# Patient Record
Sex: FEMALE | Race: WHITE | NOT HISPANIC OR LATINO | Employment: OTHER | URBAN - METROPOLITAN AREA
[De-identification: names, ages, dates, MRNs, and addresses within clinical notes are randomized per-mention and may not be internally consistent; named-entity substitution may affect disease eponyms.]

---

## 2018-08-27 ENCOUNTER — OFFICE VISIT CONVERTED (OUTPATIENT)
Dept: FAMILY MEDICINE CLINIC | Facility: CLINIC | Age: 58
End: 2018-08-27
Attending: NURSE PRACTITIONER

## 2019-03-13 ENCOUNTER — HOSPITAL ENCOUNTER (EMERGENCY)
Facility: HOSPITAL | Age: 59
Discharge: HOME | End: 2019-03-13
Attending: EMERGENCY MEDICINE | Admitting: EMERGENCY MEDICINE

## 2019-03-13 ENCOUNTER — APPOINTMENT (OUTPATIENT)
Dept: GENERAL RADIOLOGY | Facility: HOSPITAL | Age: 59
End: 2019-03-13

## 2019-03-13 VITALS
RESPIRATION RATE: 18 BRPM | HEART RATE: 88 BPM | OXYGEN SATURATION: 89 % | BODY MASS INDEX: 38.51 KG/M2 | SYSTOLIC BLOOD PRESSURE: 121 MMHG | TEMPERATURE: 98.7 F | HEIGHT: 69 IN | WEIGHT: 260 LBS | DIASTOLIC BLOOD PRESSURE: 83 MMHG

## 2019-03-13 DIAGNOSIS — R09.1 PLEURISY: Primary | ICD-10-CM

## 2019-03-13 DIAGNOSIS — J06.9 VIRAL UPPER RESPIRATORY TRACT INFECTION: ICD-10-CM

## 2019-03-13 DIAGNOSIS — R05.9 COUGH: ICD-10-CM

## 2019-03-13 LAB
FLUAV AG NPH QL: NEGATIVE
FLUBV AG NPH QL IA: NEGATIVE

## 2019-03-13 PROCEDURE — 87804 INFLUENZA ASSAY W/OPTIC: CPT | Performed by: PHYSICIAN ASSISTANT

## 2019-03-13 PROCEDURE — 99283 EMERGENCY DEPT VISIT LOW MDM: CPT

## 2019-03-13 PROCEDURE — 71046 X-RAY EXAM CHEST 2 VIEWS: CPT

## 2019-03-13 RX ORDER — METHYLPREDNISOLONE 4 MG/1
TABLET ORAL
Qty: 21 TABLET | Refills: 0 | Status: SHIPPED | OUTPATIENT
Start: 2019-03-13 | End: 2019-06-18

## 2019-03-13 RX ORDER — BENZONATATE 100 MG/1
100 CAPSULE ORAL 3 TIMES DAILY PRN
Qty: 21 CAPSULE | Refills: 0 | Status: SHIPPED | OUTPATIENT
Start: 2019-03-13 | End: 2019-06-18

## 2019-03-13 RX ORDER — ALBUTEROL SULFATE 90 UG/1
2 AEROSOL, METERED RESPIRATORY (INHALATION) EVERY 4 HOURS PRN
Qty: 1 INHALER | Refills: 0 | Status: SHIPPED | OUTPATIENT
Start: 2019-03-13 | End: 2019-06-18

## 2019-03-13 NOTE — DISCHARGE INSTRUCTIONS
Return to the emergency department with worsening symptoms, uncontrolled pain, inability to tolerate oral liquids, fever greater than 101° F not controlled by Tylenol or as needed with emergent concerns.    You may also take over-the-counter decongestants as needed for nasal congestion.

## 2019-03-13 NOTE — ED PROVIDER NOTES
EMERGENCY DEPARTMENT ENCOUNTER      Room Number: 3/03    History is provided by the patient, no translation services needed    HPI:    Chief complaint: Cough, URI symptoms    Location: Pain in ribcage with cough    Quality/Severity: 8/10 when coughing, minimal otherwise    Timing/Duration: 6 days    Modifying Factors: Patient has been taking ibuprofen with minimal relief    Associated Symptoms: Positive for subjective fever, myalgias, rhinorrhea, nasal congestion, nonproductive cough, and pain in rib cage when coughing.  Reports she vomited once yesterday, denies any current nausea.  Patient denies any shortness of air, abdominal pain, diarrhea, constipation.    Narrative: Pt is a 58 y.o. female who presents complaining of dry cough for the past 6 days.  Patient reports she vomited once yesterday, but has not vomited since then.  States she is staying well-hydrated, she felt like she had a fever yesterday but did not take her temperature, also reports the other above symptoms.  Patient states she is having rib pain bilaterally when she coughs.  States she has not been coughing up any sputum.      PMD: Provider, No Known    REVIEW OF SYSTEMS  Review of Systems    General: Positive for subjective fever.   Denies any weakness or fatigue.  Denies any weight loss or weight gain.  SKIN: Denies any rashes lesions or ulcers.  Denies color change.  ENT: Positive for sore throat and rhinorrhea.  Denies ear pain.    EYES: Denies any blurred vision.  Denies any change in vision.  Denies any photophobia.  Denies any vision loss.  LUNGS: Positive for dry cough, and pain in ribs when coughing.  Denies any shortness of breath or wheezing.    Denies any hemoptysis.  CARDIAC: Denies any chest pain.  Denies palpitations.  Denies syncope.  Denies any edema  ABD: Positive for one episode of vomiting yesterday.  Denies any abdominal pain.  Denies any nausea or vomiting or diarrhea.  Denies any rectal bleeding.  Denies constipation  :  Denies any dysuria, urgency, frequency or hematuria.  Denies discharge.  Denies flank pain.  NEURO: Denies any focal weakness.  Denies headache.  Denies seizures.  Denies changes in speech or difficulty walking.  ENDOCRINE: Denies polydipsia and polyuria  M/S: Denies arthralgias, back pain, myalgias or neck pain  HEME/LYMPH: Negative for adenopathy. Does not bruise/bleed easily.   PSYCH: Negative for suicidal ideas. Denies anxiety or depression      PAST MEDICAL HISTORY  Active Ambulatory Problems     Diagnosis Date Noted   • No Active Ambulatory Problems     Resolved Ambulatory Problems     Diagnosis Date Noted   • No Resolved Ambulatory Problems     Past Medical History:   Diagnosis Date   • Migraine        PAST SURGICAL HISTORY  Past Surgical History:   Procedure Laterality Date   • BACK SURGERY     • DILATATION AND CURETTAGE         FAMILY HISTORY  History reviewed. No pertinent family history.    SOCIAL HISTORY  Social History     Socioeconomic History   • Marital status: Single     Spouse name: Not on file   • Number of children: Not on file   • Years of education: Not on file   • Highest education level: Not on file   Social Needs   • Financial resource strain: Not on file   • Food insecurity - worry: Not on file   • Food insecurity - inability: Not on file   • Transportation needs - medical: Not on file   • Transportation needs - non-medical: Not on file   Occupational History   • Not on file   Tobacco Use   • Smoking status: Never Smoker   Substance and Sexual Activity   • Alcohol use: No     Frequency: Never   • Drug use: No   • Sexual activity: Not on file   Other Topics Concern   • Not on file   Social History Narrative   • Not on file       ALLERGIES  Patient has no known allergies.    No current facility-administered medications for this encounter.   No current outpatient medications on file.    PHYSICAL EXAM  ED Triage Vitals [03/13/19 1334]   Temp Heart Rate Resp BP SpO2   98.7 °F (37.1 °C) 88 18  146/87 95 %      Temp src Heart Rate Source Patient Position BP Location FiO2 (%)   Oral Monitor Sitting Right arm --       Physical Exam    Constitutional: Pt is oriented to person, place, and time, appears well-developed and well-nourished. No distress.   HENT:   Head: Normocephalic and atraumatic.   Right Ear: External ear normal.  Serous otitis  Left Ear: External ear normal.  Serous otitis  Mouth/Throat: Oropharynx is clear and moist. No oropharyngeal exudate.   Eyes: EOM are normal. Pupils are equal, round, and reactive to light.   Neck: Normal range of motion. Neck supple.   Cardiovascular: Normal rate, regular rhythm and normal heart sounds. Exam reveals no murmur, rubs, or gallops.   Pulmonary/Chest: Respiratory effort normal and clear to auscultation bilaterally.  Abdominal: Soft, non-distended. Bowel sounds are active. There is no tenderness or guarding.   Neurological: Pt is alert and oriented to person, place, and time. No cranial nerve deficit or sensory deficit. Pt exhibits normal muscle tone and strength in all extremities bilaterally. Coordination normal.   MSK: No gross deformities appreciated.   Skin: Skin is warm and dry.   Psychiatric: Pt has a normal mood and affect. Behavior is normal. Judgment and thought content normal.         LAB RESULTS  Results for orders placed or performed during the hospital encounter of 03/13/19   Influenza Antigen, Rapid - Swab, Nasopharynx   Result Value Ref Range    Influenza A Ag, EIA Negative Negative    Influenza B Ag, EIA Negative Negative         I ordered the above labs and reviewed the results    RADIOLOGY  Xr Chest 2 View    Result Date: 3/13/2019  Narrative: CHEST X-RAY, 3/13/2019     HISTORY: 58-year-old female in the ED complaining of 2-3 week history of cough. Chest wall pain with coughing.  TECHNIQUE: PA and lateral upright chest series.  FINDINGS: Shallow lung expansion with mild basilar atelectasis. The lungs appear clear. No visible airspace  consolidation or pleural effusion. Heart size and pulmonary vascularity are within normal limits. Benign calcified granulomas in the right midlung and right hilum.      Impression: No active disease.  This report was finalized on 3/13/2019 2:49 PM by Dr. Justin Barrera MD.        I ordered the above radiologic testing and reviewed the results    PROCEDURES  Procedures      PROGRESS AND CONSULTS  ED Course as of Mar 13 1508   Wed Mar 13, 2019   1507 Discussed lab and imaging results with patient, informed her flu swab is negative, and her chest x-ray did not show any pneumonia.  Discussed that we would be giving her Tessalon, albuterol, and Medrol Dosepak.  We will also give her the contact information for local primary care providers.  [KS]      ED Course User Index  [KS] Janine Hinkle PA-C           MEDICAL DECISION MAKING  Results were reviewed/discussed with the patient and they were also made aware of online access. Pt also made aware that some labs, such as cultures, will not be resulted during ER visit and follow up with PMD is necessary.     MDM  Number of Diagnoses or Management Options  Cough:   Pleurisy:   Viral upper respiratory tract infection:      Amount and/or Complexity of Data Reviewed  Clinical lab tests: reviewed and ordered  Tests in the radiology section of CPT®: reviewed and ordered  Independent visualization of images, tracings, or specimens: yes    Risk of Complications, Morbidity, and/or Mortality  Presenting problems: low  Diagnostic procedures: low  Management options: low    Patient Progress  Patient progress: stable         My differential diagnosis for cough includes but is not limited to:  Upper respiratory infection, bronchitis, pneumonia, COPD exacerbation, cough variant asthma, bacterial, viral or lung infections, lung cancer, aspiration pneumonitis, aspiration of foreign body    DIAGNOSIS  Final diagnoses:   Pleurisy   Cough   Viral upper respiratory tract infection        Latest Documented Vital Signs:  As of 2:15 PM  BP- 120/90 HR- 88 Temp- 98.7 °F (37.1 °C) (Oral) O2 sat- 91%    DISPOSITION  Patient discharged with instructions to follow-up with primary care provider.  Prescriptions given for Tessalon, albuterol, Medrol Dosepak.    Discussed pertinent labs and imaging findings with the patient/family.  Patient/Family voiced understanding of need to follow-up for recheck, further testing as needed.  Return to the emergency Department warnings were given.         Medication List      New Prescriptions    albuterol sulfate  (90 Base) MCG/ACT inhaler  Commonly known as:  PROVENTIL HFA;VENTOLIN HFA;PROAIR HFA  Inhale 2 puffs Every 4 (Four) Hours As Needed for Shortness of Air.     benzonatate 100 MG capsule  Commonly known as:  TESSALON  Take 1 capsule by mouth 3 (Three) Times a Day As Needed for Cough.     methylPREDNISolone 4 MG tablet  Commonly known as:  MEDROL (SAMANTHA)  Take as directed on package instructions.                Dictated utilizing Dragon dictation       Janine Hinkle PA-C  03/13/19 8298

## 2019-06-18 ENCOUNTER — APPOINTMENT (OUTPATIENT)
Dept: CT IMAGING | Facility: HOSPITAL | Age: 59
End: 2019-06-18

## 2019-06-18 ENCOUNTER — HOSPITAL ENCOUNTER (EMERGENCY)
Facility: HOSPITAL | Age: 59
Discharge: HOME | End: 2019-06-18
Attending: EMERGENCY MEDICINE | Admitting: EMERGENCY MEDICINE

## 2019-06-18 VITALS
TEMPERATURE: 97.7 F | HEART RATE: 82 BPM | SYSTOLIC BLOOD PRESSURE: 98 MMHG | OXYGEN SATURATION: 90 % | RESPIRATION RATE: 20 BRPM | WEIGHT: 241.5 LBS | DIASTOLIC BLOOD PRESSURE: 66 MMHG | HEIGHT: 69 IN | BODY MASS INDEX: 35.77 KG/M2

## 2019-06-18 DIAGNOSIS — N23 RENAL COLIC ON LEFT SIDE: ICD-10-CM

## 2019-06-18 DIAGNOSIS — N20.1 URETEROLITHIASIS: Primary | ICD-10-CM

## 2019-06-18 LAB
ALBUMIN SERPL-MCNC: 3.9 G/DL (ref 3.5–5.2)
ALBUMIN/GLOB SERPL: 1.4 G/DL
ALP SERPL-CCNC: 57 U/L (ref 39–117)
ALT SERPL W P-5'-P-CCNC: 14 U/L (ref 1–33)
AMORPH URATE CRY URNS QL MICRO: ABNORMAL /HPF
ANION GAP SERPL CALCULATED.3IONS-SCNC: 14.4 MMOL/L
AST SERPL-CCNC: 21 U/L (ref 1–32)
BACTERIA UR QL AUTO: ABNORMAL /HPF
BASOPHILS # BLD AUTO: 0.03 10*3/MM3 (ref 0–0.2)
BASOPHILS NFR BLD AUTO: 0.5 % (ref 0–1.5)
BILIRUB SERPL-MCNC: 0.4 MG/DL (ref 0.2–1.2)
BILIRUB UR QL STRIP: ABNORMAL
BUN BLD-MCNC: 17 MG/DL (ref 6–20)
BUN/CREAT SERPL: 21 (ref 7–25)
CALCIUM SPEC-SCNC: 9.1 MG/DL (ref 8.6–10.5)
CHLORIDE SERPL-SCNC: 107 MMOL/L (ref 98–107)
CLARITY UR: ABNORMAL
CO2 SERPL-SCNC: 21.6 MMOL/L (ref 22–29)
COLOR UR: YELLOW
CREAT BLD-MCNC: 0.81 MG/DL (ref 0.57–1)
DEPRECATED RDW RBC AUTO: 45.5 FL (ref 37–54)
EOSINOPHIL # BLD AUTO: 0.06 10*3/MM3 (ref 0–0.4)
EOSINOPHIL NFR BLD AUTO: 0.9 % (ref 0.3–6.2)
ERYTHROCYTE [DISTWIDTH] IN BLOOD BY AUTOMATED COUNT: 13.2 % (ref 12.3–15.4)
GFR SERPL CREATININE-BSD FRML MDRD: 73 ML/MIN/1.73
GLOBULIN UR ELPH-MCNC: 2.8 GM/DL
GLUCOSE BLD-MCNC: 154 MG/DL (ref 65–99)
GLUCOSE UR STRIP-MCNC: NEGATIVE MG/DL
HCG SERPL QL: NEGATIVE
HCT VFR BLD AUTO: 46.3 % (ref 34–46.6)
HGB BLD-MCNC: 15.5 G/DL (ref 12–15.9)
HGB UR QL STRIP.AUTO: ABNORMAL
HYALINE CASTS UR QL AUTO: ABNORMAL /LPF
IMM GRANULOCYTES # BLD AUTO: 0.01 10*3/MM3 (ref 0–0.05)
IMM GRANULOCYTES NFR BLD AUTO: 0.2 % (ref 0–0.5)
KETONES UR QL STRIP: ABNORMAL
LEUKOCYTE ESTERASE UR QL STRIP.AUTO: NEGATIVE
LYMPHOCYTES # BLD AUTO: 1.61 10*3/MM3 (ref 0.7–3.1)
LYMPHOCYTES NFR BLD AUTO: 24.6 % (ref 19.6–45.3)
MCH RBC QN AUTO: 31.9 PG (ref 26.6–33)
MCHC RBC AUTO-ENTMCNC: 33.5 G/DL (ref 31.5–35.7)
MCV RBC AUTO: 95.3 FL (ref 79–97)
MONOCYTES # BLD AUTO: 0.39 10*3/MM3 (ref 0.1–0.9)
MONOCYTES NFR BLD AUTO: 6 % (ref 5–12)
NEUTROPHILS # BLD AUTO: 4.45 10*3/MM3 (ref 1.7–7)
NEUTROPHILS NFR BLD AUTO: 67.8 % (ref 42.7–76)
NITRITE UR QL STRIP: NEGATIVE
NRBC BLD AUTO-RTO: 0 /100 WBC (ref 0–0.2)
PH UR STRIP.AUTO: <=5 [PH] (ref 4.5–8)
PLATELET # BLD AUTO: 214 10*3/MM3 (ref 140–450)
PMV BLD AUTO: 10.7 FL (ref 6–12)
POTASSIUM BLD-SCNC: 3.8 MMOL/L (ref 3.5–5.2)
PROT SERPL-MCNC: 6.7 G/DL (ref 6–8.5)
PROT UR QL STRIP: ABNORMAL
RBC # BLD AUTO: 4.86 10*6/MM3 (ref 3.77–5.28)
RBC # UR: ABNORMAL /HPF
REF LAB TEST METHOD: ABNORMAL
SODIUM BLD-SCNC: 143 MMOL/L (ref 136–145)
SP GR UR STRIP: 1.03 (ref 1–1.03)
SQUAMOUS #/AREA URNS HPF: ABNORMAL /HPF
UROBILINOGEN UR QL STRIP: ABNORMAL
WBC NRBC COR # BLD: 6.55 10*3/MM3 (ref 3.4–10.8)
WBC UR QL AUTO: ABNORMAL /HPF

## 2019-06-18 PROCEDURE — 96376 TX/PRO/DX INJ SAME DRUG ADON: CPT

## 2019-06-18 PROCEDURE — 81001 URINALYSIS AUTO W/SCOPE: CPT | Performed by: EMERGENCY MEDICINE

## 2019-06-18 PROCEDURE — 25010000002 ONDANSETRON PER 1 MG: Performed by: EMERGENCY MEDICINE

## 2019-06-18 PROCEDURE — 99284 EMERGENCY DEPT VISIT MOD MDM: CPT | Performed by: EMERGENCY MEDICINE

## 2019-06-18 PROCEDURE — 99284 EMERGENCY DEPT VISIT MOD MDM: CPT

## 2019-06-18 PROCEDURE — 74176 CT ABD & PELVIS W/O CONTRAST: CPT

## 2019-06-18 PROCEDURE — 80053 COMPREHEN METABOLIC PANEL: CPT | Performed by: EMERGENCY MEDICINE

## 2019-06-18 PROCEDURE — 96375 TX/PRO/DX INJ NEW DRUG ADDON: CPT

## 2019-06-18 PROCEDURE — 25010000002 HYDROMORPHONE PER 4 MG: Performed by: EMERGENCY MEDICINE

## 2019-06-18 PROCEDURE — 25010000002 PROMETHAZINE PER 50 MG: Performed by: EMERGENCY MEDICINE

## 2019-06-18 PROCEDURE — 96374 THER/PROPH/DIAG INJ IV PUSH: CPT

## 2019-06-18 PROCEDURE — 84703 CHORIONIC GONADOTROPIN ASSAY: CPT | Performed by: EMERGENCY MEDICINE

## 2019-06-18 PROCEDURE — 85025 COMPLETE CBC W/AUTO DIFF WBC: CPT | Performed by: EMERGENCY MEDICINE

## 2019-06-18 RX ORDER — HYDROMORPHONE HCL 110MG/55ML
0.5 PATIENT CONTROLLED ANALGESIA SYRINGE INTRAVENOUS ONCE
Status: COMPLETED | OUTPATIENT
Start: 2019-06-18 | End: 2019-06-18

## 2019-06-18 RX ORDER — ONDANSETRON 2 MG/ML
4 INJECTION INTRAMUSCULAR; INTRAVENOUS ONCE
Status: COMPLETED | OUTPATIENT
Start: 2019-06-18 | End: 2019-06-18

## 2019-06-18 RX ORDER — SODIUM CHLORIDE 0.9 % (FLUSH) 0.9 %
10 SYRINGE (ML) INJECTION AS NEEDED
Status: DISCONTINUED | OUTPATIENT
Start: 2019-06-18 | End: 2019-06-18 | Stop reason: HOSPADM

## 2019-06-18 RX ORDER — SODIUM CHLORIDE 9 MG/ML
INJECTION, SOLUTION INTRAVENOUS
Status: DISCONTINUED
Start: 2019-06-18 | End: 2019-06-18 | Stop reason: HOSPADM

## 2019-06-18 RX ORDER — PROMETHAZINE HYDROCHLORIDE 25 MG/1
TABLET ORAL
Qty: 30 TABLET | Refills: 0 | Status: SHIPPED | OUTPATIENT
Start: 2019-06-18 | End: 2019-12-11

## 2019-06-18 RX ORDER — TAMSULOSIN HYDROCHLORIDE 0.4 MG/1
CAPSULE ORAL
Qty: 7 CAPSULE | Refills: 0 | Status: SHIPPED | OUTPATIENT
Start: 2019-06-18 | End: 2019-12-11

## 2019-06-18 RX ORDER — PROMETHAZINE HYDROCHLORIDE 25 MG/ML
12.5 INJECTION, SOLUTION INTRAMUSCULAR; INTRAVENOUS ONCE
Status: COMPLETED | OUTPATIENT
Start: 2019-06-18 | End: 2019-06-18

## 2019-06-18 RX ORDER — HYDROCODONE BITARTRATE AND ACETAMINOPHEN 5; 325 MG/1; MG/1
TABLET ORAL
Qty: 15 TABLET | Refills: 0 | Status: SHIPPED | OUTPATIENT
Start: 2019-06-18 | End: 2019-12-11

## 2019-06-18 RX ADMIN — PROMETHAZINE HYDROCHLORIDE 12.5 MG: 25 INJECTION INTRAMUSCULAR; INTRAVENOUS at 10:27

## 2019-06-18 RX ADMIN — HYDROMORPHONE HYDROCHLORIDE 0.5 MG: 2 INJECTION, SOLUTION INTRAMUSCULAR; INTRAVENOUS; SUBCUTANEOUS at 09:25

## 2019-06-18 RX ADMIN — ONDANSETRON 4 MG: 2 INJECTION, SOLUTION INTRAMUSCULAR; INTRAVENOUS at 09:32

## 2019-06-18 RX ADMIN — SODIUM CHLORIDE 1000 ML: 9 INJECTION, SOLUTION INTRAVENOUS at 09:34

## 2019-06-18 RX ADMIN — HYDROMORPHONE HYDROCHLORIDE 0.5 MG: 2 INJECTION, SOLUTION INTRAMUSCULAR; INTRAVENOUS; SUBCUTANEOUS at 10:27

## 2019-12-11 ENCOUNTER — HOSPITAL ENCOUNTER (OUTPATIENT)
Dept: GENERAL RADIOLOGY | Facility: HOSPITAL | Age: 59
Discharge: HOME | End: 2019-12-11
Admitting: FAMILY MEDICINE

## 2019-12-11 ENCOUNTER — OFFICE VISIT (OUTPATIENT)
Dept: FAMILY MEDICINE CLINIC | Facility: CLINIC | Age: 59
End: 2019-12-11

## 2019-12-11 VITALS
WEIGHT: 251 LBS | HEIGHT: 69 IN | TEMPERATURE: 97.4 F | HEART RATE: 80 BPM | DIASTOLIC BLOOD PRESSURE: 70 MMHG | OXYGEN SATURATION: 98 % | SYSTOLIC BLOOD PRESSURE: 112 MMHG | RESPIRATION RATE: 14 BRPM | BODY MASS INDEX: 37.18 KG/M2

## 2019-12-11 DIAGNOSIS — E66.09 OTHER OBESITY DUE TO EXCESS CALORIES: ICD-10-CM

## 2019-12-11 DIAGNOSIS — E66.01 CLASS 2 SEVERE OBESITY DUE TO EXCESS CALORIES WITH SERIOUS COMORBIDITY AND BODY MASS INDEX (BMI) OF 37.0 TO 37.9 IN ADULT (HCC): ICD-10-CM

## 2019-12-11 DIAGNOSIS — Z12.11 ENCOUNTER FOR SCREENING COLONOSCOPY: ICD-10-CM

## 2019-12-11 DIAGNOSIS — Z12.4 PAP SMEAR FOR CERVICAL CANCER SCREENING: ICD-10-CM

## 2019-12-11 DIAGNOSIS — R00.2 PALPITATIONS: ICD-10-CM

## 2019-12-11 DIAGNOSIS — Z00.00 ENCOUNTER FOR WELL ADULT EXAM WITHOUT ABNORMAL FINDINGS: Primary | ICD-10-CM

## 2019-12-11 DIAGNOSIS — R60.0 LOWER EXTREMITY EDEMA: ICD-10-CM

## 2019-12-11 PROBLEM — M51.36 DEGENERATION OF LUMBAR INTERVERTEBRAL DISC: Status: ACTIVE | Noted: 2018-08-15

## 2019-12-11 PROBLEM — M47.816 LUMBAR SPONDYLOSIS: Status: ACTIVE | Noted: 2018-08-15

## 2019-12-11 PROBLEM — Z59.00 HOMELESS SINGLE PERSON: Status: ACTIVE | Noted: 2019-12-11

## 2019-12-11 PROCEDURE — 71046 X-RAY EXAM CHEST 2 VIEWS: CPT

## 2019-12-11 PROCEDURE — 99205 OFFICE O/P NEW HI 60 MIN: CPT | Performed by: FAMILY MEDICINE

## 2019-12-11 RX ORDER — POTASSIUM CHLORIDE 750 MG/1
10 TABLET, FILM COATED, EXTENDED RELEASE ORAL DAILY
Qty: 30 TABLET | Refills: 3 | Status: SHIPPED | OUTPATIENT
Start: 2019-12-11 | End: 2019-12-16

## 2019-12-11 RX ORDER — FUROSEMIDE 20 MG/1
20 TABLET ORAL DAILY
Qty: 30 TABLET | Refills: 3 | Status: SHIPPED | OUTPATIENT
Start: 2019-12-11 | End: 2019-12-16

## 2019-12-11 NOTE — ASSESSMENT & PLAN NOTE
She was given numbers for [facility] [phone]; [facility] [phone]; [facility] [phone].

## 2019-12-11 NOTE — ASSESSMENT & PLAN NOTE
Liver and kidney function were normal in June.  Check 2D echo.  Refer to cardiology.  Chest x-ray.

## 2019-12-11 NOTE — PROGRESS NOTES
Subjective   Jaye Rivas is a 59 y.o. female is here for   Chief Complaint   Patient presents with   • Leg Swelling     bilat       History of Present Illness     Ms. Rivas is here today to establish with a primary care provider.      She states she has swelling in both of her legs.  She does not drink alcohol and she does not use illegal drugs.  She states his leg swelling started about 2 months ago.    She had a kidney stone for the first time back in June 2019 and went to [facility].  She was on Flomax but is no longer taking that now.    She has a history of migraines.    She has occasional palpitations.    She is .  She was living with her son and his wife.  She states that she was doing all of the cooking, cleaning, and watching a 1-year-old, 2-year-old, and 3-year-old child.  She states she was getting very little to no help.  She states she complained about it and they kicked her out of the house.  She states she is now homeless and living in her car.      The following portions of the patient's history were reviewed and updated as appropriate: allergies, current medications, past family history, past medical history, past social history, past surgical history and problem list.     reports that she has never smoked. She has never used smokeless tobacco. She reports that she does not drink alcohol or use drugs.    Review of Systems   Constitutional: Negative for activity change and unexpected weight change.   HENT: Negative for congestion.    Eyes: Negative for discharge.   Respiratory: Positive for shortness of breath. Negative for wheezing.    Cardiovascular: Positive for leg swelling. Negative for chest pain and palpitations.   Gastrointestinal: Negative for abdominal pain, blood in stool and constipation.   Genitourinary: Negative for difficulty urinating and hematuria.   Musculoskeletal: Negative for gait problem.   Skin: Negative for color change and rash.        PHQ-9 Depression  "Screening  Little interest or pleasure in doing things? 0   Feeling down, depressed, or hopeless? 0   Trouble falling or staying asleep, or sleeping too much?     Feeling tired or having little energy?     Poor appetite or overeating?     Feeling bad about yourself - or that you are a failure or have let yourself or your family down?     Trouble concentrating on things, such as reading the newspaper or watching television?     Moving or speaking so slowly that other people could have noticed? Or the opposite - being so fidgety or restless that you have been moving around a lot more than usual?     Thoughts that you would be better off dead, or of hurting yourself in some way?     PHQ-9 Total Score 0   If you checked off any problems, how difficult have these problems made it for you to do your work, take care of things at home, or get along with other people?           Objective   /70 (BP Location: Left arm, Patient Position: Sitting, Cuff Size: Large Adult)   Pulse 80   Temp 97.4 °F (36.3 °C) (Oral)   Resp 14   Ht 175.3 cm (69\")   Wt 114 kg (251 lb)   SpO2 98%   BMI 37.07 kg/m²   Physical Exam   Constitutional: She is oriented to person, place, and time. She appears well-developed and well-nourished. No distress.   HENT:   Head: Normocephalic and atraumatic.   Right Ear: External ear normal.   Left Ear: External ear normal.   Nose: Nose normal.   Mouth/Throat: Oropharynx is clear and moist. No oropharyngeal exudate.   Eyes: Lids are normal. Right eye exhibits no discharge. Left eye exhibits no discharge. No scleral icterus.   Neck: Trachea normal, normal range of motion and full passive range of motion without pain. Neck supple. No tracheal deviation and no edema present. No thyromegaly present.   Cardiovascular: Normal rate, regular rhythm, normal heart sounds and intact distal pulses. Exam reveals no gallop and no friction rub.   No murmur heard.  Pulmonary/Chest: Effort normal and breath sounds " normal. No stridor. No tachypnea and no bradypnea. No respiratory distress. She has no decreased breath sounds. She has no wheezes. She has no rales. She exhibits no tenderness.   Abdominal: Normal appearance. There is no hepatosplenomegaly.   Musculoskeletal: She exhibits no edema.   Lymphadenopathy:        Head (right side): No submental, no submandibular, no tonsillar, no preauricular, no posterior auricular and no occipital adenopathy present.        Head (left side): No submental, no submandibular, no tonsillar, no preauricular, no posterior auricular and no occipital adenopathy present.     She has no cervical adenopathy.        Right cervical: No superficial cervical, no deep cervical and no posterior cervical adenopathy present.       Left cervical: No superficial cervical, no deep cervical and no posterior cervical adenopathy present.   Neurological: She is alert and oriented to person, place, and time. She has normal strength and normal reflexes. She is not disoriented.   Skin: Skin is warm, dry and intact. Capillary refill takes less than 2 seconds. No rash noted. She is not diaphoretic. No cyanosis or erythema. No pallor. Nails show no clubbing.   Psychiatric: She has a normal mood and affect. Her behavior is normal. Cognition and memory are normal.   Nursing note and vitals reviewed.      Procedures    Assessment/Plan   Diagnoses and all orders for this visit:    1. Encounter for well adult exam without abnormal findings (Primary)  -     Comprehensive metabolic panel  -     LDL cholesterol, direct  -     TSH  -     CBC w AUTO Differential  -     Urinalysis With Microscopic - Urine, Clean Catch  -     Adult Transthoracic Echo Complete W/ Cont if Necessary Per Protocol; Future    2. Palpitations  Assessment & Plan:  Refer to cardiology.  Go to the emergency room if any symptoms occur.      Orders:  -     Adult Transthoracic Echo Complete W/ Cont if Necessary Per Protocol; Future  -     XR Chest PA &  Lateral  -     Ambulatory Referral to Cardiology    3. Lower extremity edema  Assessment & Plan:   Liver and kidney function were normal in June.  Check 2D echo.  Refer to cardiology.  Chest x-ray.      Orders:  -     XR Chest PA & Lateral  -     Ambulatory Referral to Cardiology  -     furosemide (LASIX) 20 MG tablet; Take 1 tablet by mouth Daily.  Dispense: 30 tablet; Refill: 3  -     potassium chloride (K-DUR) 10 MEQ CR tablet; Take 1 tablet by mouth Daily.  Dispense: 30 tablet; Refill: 3    4. Pap smear for cervical cancer screening  -     Ambulatory Referral to Obstetrics / Gynecology    5. Encounter for screening colonoscopy  -     Ambulatory Referral to Gastroenterology    6. Other obesity due to excess calories   -     LDL cholesterol, direct    7. Class 2 severe obesity due to excess calories with serious comorbidity and body mass index (BMI) of 37.0 to 37.9 in adult (CMS/[facility])

## 2019-12-11 NOTE — PATIENT INSTRUCTIONS
[facility] [phone]    [facility] [place] [phone]    [facility] [phone].    [facility] [phone]    [employer]    [facility] [address] [place] [address] [phone].  [facility] [address].  [facility] with a black indio iron fence around the building.    [facility] [phone].  Call for bed reservations 10 AM to 4 PM

## 2019-12-12 LAB
ALBUMIN SERPL-MCNC: 4.2 G/DL (ref 3.5–5.2)
ALBUMIN/GLOB SERPL: 1.8 G/DL
ALP SERPL-CCNC: 64 U/L (ref 39–117)
ALT SERPL-CCNC: 15 U/L (ref 1–33)
AST SERPL-CCNC: 16 U/L (ref 1–32)
BASOPHILS # BLD AUTO: 0.03 10*3/MM3 (ref 0–0.2)
BASOPHILS NFR BLD AUTO: 0.5 % (ref 0–1.5)
BILIRUB SERPL-MCNC: 0.2 MG/DL (ref 0.2–1.2)
BUN SERPL-MCNC: 11 MG/DL (ref 6–20)
BUN/CREAT SERPL: 15.5 (ref 7–25)
CALCIUM SERPL-MCNC: 9 MG/DL (ref 8.6–10.5)
CHLORIDE SERPL-SCNC: 106 MMOL/L (ref 98–107)
CO2 SERPL-SCNC: 33.2 MMOL/L (ref 22–29)
CREAT SERPL-MCNC: 0.71 MG/DL (ref 0.57–1)
EOSINOPHIL # BLD AUTO: 0.06 10*3/MM3 (ref 0–0.4)
EOSINOPHIL NFR BLD AUTO: 0.9 % (ref 0.3–6.2)
ERYTHROCYTE [DISTWIDTH] IN BLOOD BY AUTOMATED COUNT: 11.9 % (ref 12.3–15.4)
GLOBULIN SER CALC-MCNC: 2.4 GM/DL
GLUCOSE SERPL-MCNC: 78 MG/DL (ref 65–99)
HCT VFR BLD AUTO: 40.9 % (ref 34–46.6)
HGB BLD-MCNC: 13.8 G/DL (ref 12–15.9)
IMM GRANULOCYTES # BLD AUTO: 0.02 10*3/MM3 (ref 0–0.05)
IMM GRANULOCYTES NFR BLD AUTO: 0.3 % (ref 0–0.5)
LDLC SERPL DIRECT ASSAY-MCNC: 126 MG/DL (ref 0–100)
LYMPHOCYTES # BLD AUTO: 1.3 10*3/MM3 (ref 0.7–3.1)
LYMPHOCYTES NFR BLD AUTO: 19.8 % (ref 19.6–45.3)
MCH RBC QN AUTO: 31.5 PG (ref 26.6–33)
MCHC RBC AUTO-ENTMCNC: 33.7 G/DL (ref 31.5–35.7)
MCV RBC AUTO: 93.4 FL (ref 79–97)
MONOCYTES # BLD AUTO: 0.54 10*3/MM3 (ref 0.1–0.9)
MONOCYTES NFR BLD AUTO: 8.2 % (ref 5–12)
NEUTROPHILS # BLD AUTO: 4.63 10*3/MM3 (ref 1.7–7)
NEUTROPHILS NFR BLD AUTO: 70.3 % (ref 42.7–76)
NRBC BLD AUTO-RTO: 0 /100 WBC (ref 0–0.2)
PLATELET # BLD AUTO: 276 10*3/MM3 (ref 140–450)
POTASSIUM SERPL-SCNC: 3.8 MMOL/L (ref 3.5–5.2)
PROT SERPL-MCNC: 6.6 G/DL (ref 6–8.5)
RBC # BLD AUTO: 4.38 10*6/MM3 (ref 3.77–5.28)
SODIUM SERPL-SCNC: 148 MMOL/L (ref 136–145)
TSH SERPL DL<=0.005 MIU/L-ACNC: 1.2 UIU/ML (ref 0.27–4.2)
UNABLE TO VOID: NORMAL
WBC # BLD AUTO: 6.58 10*3/MM3 (ref 3.4–10.8)

## 2019-12-12 NOTE — PROGRESS NOTES
Please call the patient regarding her result(s).  Please let her know that her sodium was slightly elevated.  I recommend that she reduce the amount of sodium/salt in her diet.

## 2019-12-16 ENCOUNTER — HOSPITAL ENCOUNTER (OUTPATIENT)
Dept: ULTRASOUND IMAGING | Facility: HOSPITAL | Age: 59
Discharge: HOME | End: 2019-12-16

## 2019-12-16 ENCOUNTER — HOSPITAL ENCOUNTER (OUTPATIENT)
Dept: ULTRASOUND IMAGING | Facility: HOSPITAL | Age: 59
Discharge: HOME | End: 2019-12-16
Admitting: FAMILY MEDICINE

## 2019-12-16 ENCOUNTER — OFFICE VISIT (OUTPATIENT)
Dept: FAMILY MEDICINE CLINIC | Facility: CLINIC | Age: 59
End: 2019-12-16

## 2019-12-16 VITALS
OXYGEN SATURATION: 98 % | RESPIRATION RATE: 14 BRPM | HEIGHT: 69 IN | BODY MASS INDEX: 37.62 KG/M2 | WEIGHT: 254 LBS | SYSTOLIC BLOOD PRESSURE: 114 MMHG | TEMPERATURE: 97.8 F | DIASTOLIC BLOOD PRESSURE: 72 MMHG | HEART RATE: 78 BPM

## 2019-12-16 DIAGNOSIS — M79.605 PAIN IN BOTH LOWER EXTREMITIES: Primary | ICD-10-CM

## 2019-12-16 DIAGNOSIS — M79.604 PAIN IN BOTH LOWER EXTREMITIES: Primary | ICD-10-CM

## 2019-12-16 DIAGNOSIS — R60.0 LOWER EXTREMITY EDEMA: ICD-10-CM

## 2019-12-16 DIAGNOSIS — R09.89 OTHER SPECIFIED SYMPTOMS AND SIGNS INVOLVING THE CIRCULATORY AND RESPIRATORY SYSTEMS: ICD-10-CM

## 2019-12-16 PROBLEM — I73.9 INTERMITTENT CLAUDICATION: Status: ACTIVE | Noted: 2019-12-16

## 2019-12-16 PROCEDURE — 93970 EXTREMITY STUDY: CPT

## 2019-12-16 PROCEDURE — 93923 UPR/LXTR ART STDY 3+ LVLS: CPT

## 2019-12-16 PROCEDURE — 99214 OFFICE O/P EST MOD 30 MIN: CPT | Performed by: FAMILY MEDICINE

## 2019-12-16 RX ORDER — POTASSIUM CHLORIDE 1500 MG/1
20 TABLET, FILM COATED, EXTENDED RELEASE ORAL DAILY
Qty: 90 TABLET | Refills: 1 | Status: SHIPPED | OUTPATIENT
Start: 2019-12-16 | End: 2020-02-10 | Stop reason: DRUGHIGH

## 2019-12-16 RX ORDER — FUROSEMIDE 40 MG/1
40 TABLET ORAL DAILY
Qty: 90 TABLET | Refills: 1 | Status: SHIPPED | OUTPATIENT
Start: 2019-12-16 | End: 2020-02-10

## 2019-12-16 NOTE — PROGRESS NOTES
Subjective   Jaye Rivas is a 59 y.o. female is here for   Chief Complaint   Patient presents with   • Follow-up     Follow-up here on Monday for lower extremity edema (started Lasix 20 mg and potassium 10 mEq)       History of Present Illness     Patient is here for follow-up on her lower extremity edema.  She states she has had this leg swelling for the past 3 to 4 months.  She has pain in both of her legs, including below the knees and now going up above her knees.  The pain in her legs and her thighs and her calves gets worse when she is walking.  Gets better when she rests.    The following portions of the patient's history were reviewed and updated as appropriate: allergies, current medications, past family history, past medical history, past social history, past surgical history and problem list.     reports that she has never smoked. She has never used smokeless tobacco. She reports that she does not drink alcohol or use drugs.    Review of Systems   Constitutional: Negative for activity change and unexpected weight change.   HENT: Negative for congestion.    Respiratory: Negative for shortness of breath and wheezing.    Cardiovascular: Positive for leg swelling. Negative for chest pain and palpitations.   Gastrointestinal: Negative for abdominal pain, blood in stool and constipation.   Genitourinary: Negative for difficulty urinating and hematuria.   Musculoskeletal: Negative for gait problem.   Skin: Negative for color change and rash.        PHQ-9 Depression Screening  Little interest or pleasure in doing things?     Feeling down, depressed, or hopeless?     Trouble falling or staying asleep, or sleeping too much?     Feeling tired or having little energy?     Poor appetite or overeating?     Feeling bad about yourself - or that you are a failure or have let yourself or your family down?     Trouble concentrating on things, such as reading the newspaper or watching television?     Moving or speaking so  "slowly that other people could have noticed? Or the opposite - being so fidgety or restless that you have been moving around a lot more than usual?     Thoughts that you would be better off dead, or of hurting yourself in some way?     PHQ-9 Total Score     If you checked off any problems, how difficult have these problems made it for you to do your work, take care of things at home, or get along with other people?           Objective   /72 (BP Location: Left arm)   Pulse 78   Temp 97.8 °F (36.6 °C) (Oral)   Resp 14   Ht 175.3 cm (69\")   Wt 115 kg (254 lb)   SpO2 98%   BMI 37.51 kg/m²   Physical Exam   Constitutional: She is oriented to person, place, and time. She appears well-developed and well-nourished. No distress.   HENT:   Head: Normocephalic and atraumatic.   Right Ear: External ear normal.   Left Ear: External ear normal.   Nose: Nose normal.   Mouth/Throat: Oropharynx is clear and moist. No oropharyngeal exudate.   Eyes: Lids are normal. Right eye exhibits no discharge. Left eye exhibits no discharge. No scleral icterus.   Neck: Trachea normal, normal range of motion and full passive range of motion without pain. Neck supple. No tracheal deviation and no edema present. No thyromegaly present.   Cardiovascular: Normal rate, regular rhythm, normal heart sounds and intact distal pulses. Exam reveals no gallop and no friction rub.   No murmur heard.  Pulmonary/Chest: Effort normal and breath sounds normal. No stridor. No tachypnea and no bradypnea. No respiratory distress. She has no decreased breath sounds. She has no wheezes. She has no rales. She exhibits no tenderness.   Abdominal: Normal appearance. There is no hepatosplenomegaly.   Musculoskeletal: She exhibits no edema.   Lymphadenopathy:        Head (right side): No submental, no submandibular, no tonsillar, no preauricular, no posterior auricular and no occipital adenopathy present.        Head (left side): No submental, no submandibular, " no tonsillar, no preauricular, no posterior auricular and no occipital adenopathy present.     She has no cervical adenopathy.        Right cervical: No superficial cervical, no deep cervical and no posterior cervical adenopathy present.       Left cervical: No superficial cervical, no deep cervical and no posterior cervical adenopathy present.   Neurological: She is alert and oriented to person, place, and time. She has normal strength and normal reflexes. She is not disoriented.   Skin: Skin is warm, dry and intact. Capillary refill takes less than 2 seconds. No rash noted. She is not diaphoretic. No cyanosis or erythema. No pallor. Nails show no clubbing.   Psychiatric: She has a normal mood and affect. Her behavior is normal. Cognition and memory are normal.   Nursing note and vitals reviewed.      Procedures    Assessment/Plan   Diagnoses and all orders for this visit:    1. Pain in both lower extremities (Primary)  Assessment & Plan:  B/L LE Venous Doppler      Orders:  -     US Venous Doppler Lower Extremity Bilateral (duplex)  -     US Ankle / Brachial Indices Extremity Complete    2. Lower extremity edema  -     US Venous Doppler Lower Extremity Bilateral (duplex)  -     US Ankle / Brachial Indices Extremity Complete    3. Other specified symptoms and signs involving the circulatory and respiratory systems   -     US Ankle / Brachial Indices Extremity Complete

## 2019-12-17 NOTE — PROGRESS NOTES
Please call and let the patient know her DUDLEY (blood pressure test) of her legs was normal.  There is no arterial blockage.

## 2019-12-23 ENCOUNTER — OFFICE VISIT (OUTPATIENT)
Dept: FAMILY MEDICINE CLINIC | Facility: CLINIC | Age: 59
End: 2019-12-23

## 2019-12-23 VITALS
BODY MASS INDEX: 35.99 KG/M2 | OXYGEN SATURATION: 97 % | RESPIRATION RATE: 14 BRPM | HEART RATE: 60 BPM | DIASTOLIC BLOOD PRESSURE: 72 MMHG | SYSTOLIC BLOOD PRESSURE: 116 MMHG | WEIGHT: 243 LBS | HEIGHT: 69 IN | TEMPERATURE: 97.5 F

## 2019-12-23 DIAGNOSIS — R06.02 SHORTNESS OF BREATH: ICD-10-CM

## 2019-12-23 DIAGNOSIS — I73.9 INTERMITTENT CLAUDICATION (HCC): ICD-10-CM

## 2019-12-23 DIAGNOSIS — Z12.31 SCREENING MAMMOGRAM, ENCOUNTER FOR: ICD-10-CM

## 2019-12-23 DIAGNOSIS — R60.0 LOWER EXTREMITY EDEMA: Primary | ICD-10-CM

## 2019-12-23 PROCEDURE — 99214 OFFICE O/P EST MOD 30 MIN: CPT | Performed by: FAMILY MEDICINE

## 2019-12-23 NOTE — ASSESSMENT & PLAN NOTE
Markedly improved from 2-3+ B/L LE edema, now trace-1+ B/L LE edema.  Continue current treatment.

## 2019-12-23 NOTE — PATIENT INSTRUCTIONS
Your echocardiogram is scheduled for January 7, 2020 at 12:00 PM at [facility].  Please arrive 30 minutes early.  No prep is needed.    Exercising to Lose Weight  Exercise is structured, repetitive physical activity to improve fitness and health. Getting regular exercise is important for everyone. It is especially important if you are overweight. Being overweight increases your risk of heart disease, stroke, diabetes, high blood pressure, and several types of cancer. Reducing your calorie intake and exercising can help you lose weight.  Exercise is usually categorized as moderate or vigorous intensity. To lose weight, most people need to do a certain amount of moderate-intensity or vigorous-intensity exercise each week.  Moderate-intensity exercise    Moderate-intensity exercise is any activity that gets you moving enough to burn at least three times more energy (calories) than if you were sitting.  Examples of moderate exercise include:  · Walking a mile in 15 minutes.  · Doing light yard work.  · Biking at an easy pace.  Most people should get at least 150 minutes (2 hours and 30 minutes) a week of moderate-intensity exercise to maintain their body weight.  Vigorous-intensity exercise  Vigorous-intensity exercise is any activity that gets you moving enough to burn at least six times more calories than if you were sitting. When you exercise at this intensity, you should be working hard enough that you are not able to carry on a conversation.  Examples of vigorous exercise include:  · Running.  · Playing a team sport, such as football, basketball, and soccer.  · Jumping rope.  Most people should get at least 75 minutes (1 hour and 15 minutes) a week of vigorous-intensity exercise to maintain their body weight.  How can exercise affect me?  When you exercise enough to burn more calories than you eat, you lose weight. Exercise also reduces body fat and builds muscle. The more muscle you have, the more  calories you burn. Exercise also:  · Improves mood.  · Reduces stress and tension.  · Improves your overall fitness, flexibility, and endurance.  · Increases bone strength.  The amount of exercise you need to lose weight depends on:  · Your age.  · The type of exercise.  · Any health conditions you have.  · Your overall physical ability.  Talk to your health care provider about how much exercise you need and what types of activities are safe for you.  What actions can I take to lose weight?  Nutrition    · Make changes to your diet as told by your health care provider or diet and nutrition specialist (dietitian). This may include:  ? Eating fewer calories.  ? Eating more protein.  ? Eating less unhealthy fats.  ? Eating a diet that includes fresh fruits and vegetables, whole grains, low-fat dairy products, and lean protein.  ? Avoiding foods with added fat, salt, and sugar.  · Drink plenty of water while you exercise to prevent dehydration or heat stroke.  Activity  · Choose an activity that you enjoy and set realistic goals. Your health care provider can help you make an exercise plan that works for you.  · Exercise at a moderate or vigorous intensity most days of the week.  ? The intensity of exercise may vary from person to person. You can tell how intense a workout is for you by paying attention to your breathing and heartbeat. Most people will notice their breathing and heartbeat get faster with more intense exercise.  · Do resistance training twice each week, such as:  ? Push-ups.  ? Sit-ups.  ? Lifting weights.  ? Using resistance bands.  · Getting short amounts of exercise can be just as helpful as long structured periods of exercise. If you have trouble finding time to exercise, try to include exercise in your daily routine.  ? Get up, stretch, and walk around every 30 minutes throughout the day.  ? Go for a walk during your lunch break.  ? Park your car farther away from your destination.  ? If you take  public transportation, get off one stop early and walk the rest of the way.  ? Make phone calls while standing up and walking around.  ? Take the stairs instead of elevators or escalators.  · Wear comfortable clothes and shoes with good support.  · Do not exercise so much that you hurt yourself, feel dizzy, or get very short of breath.  Where to find more information  · [place] Department of Health and Human Services: www.hhs.gov  · Centers for Disease Control and Prevention (CDC): www.cdc.gov  Contact a health care provider:  · Before starting a new exercise program.  · If you have questions or concerns about your weight.  · If you have a medical problem that keeps you from exercising.  Get help right away if you have any of the following while exercising:  · Injury.  · Dizziness.  · Difficulty breathing or shortness of breath that does not go away when you stop exercising.  · Chest pain.  · Rapid heartbeat.  Summary  · Being overweight increases your risk of heart disease, stroke, diabetes, high blood pressure, and several types of cancer.  · Losing weight happens when you burn more calories than you eat.  · Reducing the amount of calories you eat in addition to getting regular moderate or vigorous exercise each week helps you lose weight.  This information is not intended to replace advice given to you by your health care provider. Make sure you discuss any questions you have with your health care provider.  Document Released: 01/20/2012 Document Revised: 12/31/2018 Document Reviewed: 12/31/2018  [employer] Patient Education © 2019 [employer]

## 2019-12-23 NOTE — PROGRESS NOTES
Subjective   Jaye Rivas is a 59 y.o. female is here for   Chief Complaint   Patient presents with   • Follow-up     venous doppler       History of Present Illness     Ms. Rivas is here for follow-up on her venous Doppler and her lower extremity edema.  She is been taking the increased dose of Lasix and has lost 11 pounds.  Her swelling in her legs is much better.  She also may cause to homeless shelters and found a private company that got her into an apartment.  They are paying her first 3 months her rent and then she will take over from there.    She was having episode of dyspnea with exertion.    The following portions of the patient's history were reviewed and updated as appropriate: allergies, current medications, past family history, past medical history, past social history, past surgical history and problem list.     reports that she has never smoked. She has never used smokeless tobacco. She reports that she does not drink alcohol or use drugs.    Review of Systems   Constitutional: Negative for activity change and unexpected weight change.   HENT: Negative for congestion.    Respiratory: Negative for shortness of breath and wheezing.    Cardiovascular: Negative for chest pain and palpitations.   Gastrointestinal: Negative for abdominal pain, blood in stool and constipation.   Genitourinary: Negative for difficulty urinating and hematuria.   Musculoskeletal: Negative for gait problem.   Skin: Negative for color change and rash.        PHQ-9 Depression Screening  Little interest or pleasure in doing things?     Feeling down, depressed, or hopeless?     Trouble falling or staying asleep, or sleeping too much?     Feeling tired or having little energy?     Poor appetite or overeating?     Feeling bad about yourself - or that you are a failure or have let yourself or your family down?     Trouble concentrating on things, such as reading the newspaper or watching television?     Moving or speaking so slowly that  "other people could have noticed? Or the opposite - being so fidgety or restless that you have been moving around a lot more than usual?     Thoughts that you would be better off dead, or of hurting yourself in some way?     PHQ-9 Total Score     If you checked off any problems, how difficult have these problems made it for you to do your work, take care of things at home, or get along with other people?           Objective   /72 (BP Location: Right arm, Patient Position: Sitting, Cuff Size: Adult)   Pulse 60   Temp 97.5 °F (36.4 °C) (Oral)   Resp 14   Ht 175.3 cm (69\")   Wt 110 kg (243 lb)   SpO2 97%   BMI 35.88 kg/m²   Physical Exam   Constitutional: She is oriented to person, place, and time. She appears well-developed and well-nourished. No distress.   HENT:   Head: Normocephalic and atraumatic.   Right Ear: External ear normal.   Left Ear: External ear normal.   Nose: Nose normal.   Mouth/Throat: Oropharynx is clear and moist. No oropharyngeal exudate.   Eyes: Lids are normal. Right eye exhibits no discharge. Left eye exhibits no discharge. No scleral icterus.   Neck: Trachea normal, normal range of motion and full passive range of motion without pain. Neck supple. No tracheal deviation and no edema present. No thyromegaly present.   Cardiovascular: Normal rate, regular rhythm, normal heart sounds and intact distal pulses. Exam reveals no gallop and no friction rub.   No murmur heard.  Pulmonary/Chest: Effort normal and breath sounds normal. No stridor. No tachypnea and no bradypnea. No respiratory distress. She has no decreased breath sounds. She has no wheezes. She has no rales. She exhibits no tenderness.   Abdominal: Normal appearance. There is no hepatosplenomegaly.   Musculoskeletal: She exhibits edema (trace bilateral lower extremity edema).   Lymphadenopathy:        Head (right side): No submental, no submandibular, no tonsillar, no preauricular, no posterior auricular and no occipital " adenopathy present.        Head (left side): No submental, no submandibular, no tonsillar, no preauricular, no posterior auricular and no occipital adenopathy present.     She has no cervical adenopathy.        Right cervical: No superficial cervical, no deep cervical and no posterior cervical adenopathy present.       Left cervical: No superficial cervical, no deep cervical and no posterior cervical adenopathy present.   Neurological: She is alert and oriented to person, place, and time. She has normal strength and normal reflexes. She is not disoriented.   Skin: Skin is warm, dry and intact. Capillary refill takes less than 2 seconds. No rash noted. She is not diaphoretic. No cyanosis or erythema. No pallor. Nails show no clubbing.   Psychiatric: She has a normal mood and affect. Her behavior is normal. Cognition and memory are normal.   Nursing note and vitals reviewed.      Procedures    Assessment/Plan   Diagnoses and all orders for this visit:    1. Lower extremity edema (Primary)  Assessment & Plan:  Markedly improved from 2-3+ B/L LE edema, now trace-1+ B/L LE edema.  Continue current treatment.      Orders:  -     Comprehensive Metabolic Panel    2. Intermittent claudication (CMS/HCC)    3. Shortness of breath

## 2019-12-23 NOTE — PROGRESS NOTES
Subsequent Medicare Wellness Visit   The ABC's of the Annual Wellness Visit    Chief Complaint   Patient presents with   • Follow-up     venous doppler       HPI:  Jaye Rivas, -1960, is a 59 y.o. female who presents for a Subsequent Medicare Wellness Visit.    Recent Hospitalizations:  No hospitalization(s) within the last year..    Current Medical Providers:  Patient Care Team:  Ty Hermosillo Jr., DO as PCP - General (Family Medicine)    Health Habits and Functional and Cognitive Screening and Depression Screening:  Functional & Cognitive Status 2019   Do you have difficulty preparing food and eating? No   Do you have difficulty bathing yourself, getting dressed or grooming yourself? No   Do you have difficulty using the toilet? No   Do you have difficulty moving around from place to place? No   Do you have trouble with steps or getting out of a bed or a chair? No   Current Diet Well Balanced Diet   Dental Exam Not up to date   Eye Exam Not up to date   Exercise (times per week) 0 times per week   Current Exercise Activities Include None   Do you need help using the phone?  No   Are you deaf or do you have serious difficulty hearing?  No   Do you need help with transportation? No   Do you need help shopping? No   Do you need help preparing meals?  No   Do you need help with housework?  No   Do you need help with laundry? No   Do you need help taking your medications? No   Do you need help managing money? No   Do you ever drive or ride in a car without wearing a seat belt? No   Have you felt unusual stress, anger or loneliness in the last month? No   Who do you live with? Alone   If you need help, do you have trouble finding someone available to you? No   Have you been bothered in the last four weeks by sexual problems? No   Do you have difficulty concentrating, remembering or making decisions? No       Compared to one year ago, the patient feels her physical health is worse and her mental health  is the same.    Depression Screen:  PHQ-2/PHQ-9 Depression Screening 12/11/2019   Little interest or pleasure in doing things 0   Feeling down, depressed, or hopeless 0   Total Score 0         Past Medical/Family/Social History:  The following portions of the patient's history were reviewed and updated as appropriate: allergies, current medications, past family history, past medical history, past social history, past surgical history and problem list.    No Known Allergies      Current Outpatient Medications:   •  furosemide (LASIX) 40 MG tablet, Take 1 tablet by mouth Daily., Disp: 90 tablet, Rfl: 1  •  potassium chloride (K-TAB) 20 MEQ tablet controlled-release ER tablet, Take 1 tablet by mouth Daily., Disp: 90 tablet, Rfl: 1    Aspirin use counseling: Does not need ASA (and currently is not on it)    Current medication list contains no high risk medications.  No harmful drug interactions have been identified.     Family History   Problem Relation Age of Onset   • No Known Problems Mother    • Heart failure Father    • Breast cancer Maternal Aunt    • Breast cancer Maternal Grandmother        Social History     Tobacco Use   • Smoking status: Never Smoker   • Smokeless tobacco: Never Used   Substance Use Topics   • Alcohol use: No     Frequency: Never       Past Surgical History:   Procedure Laterality Date   • BACK SURGERY     • DILATATION AND CURETTAGE     • EYE SURGERY         Patient Active Problem List   Diagnosis   • S/P lumbar fusion   • Post-operative state   • Painful orthopaedic hardware (CMS/HCC)   • Lumbar spondylosis   • Depression with anxiety   • Degeneration of lumbar intervertebral disc   • Chronic lower back pain   • Capsulitis of shoulder   • Homeless single person   • Palpitations   • Lower extremity edema   • Class 2 severe obesity due to excess calories with serious comorbidity and body mass index (BMI) of 37.0 to 37.9 in adult (CMS/HCC)   • Pain in both lower extremities   • Intermittent  "claudication (CMS/[facility])       Review of Systems    Objective     Vitals:    12/23/19 1037   BP: 116/72   BP Location: Right arm   Patient Position: Sitting   Cuff Size: Adult   Pulse: 60   Resp: 14   Temp: 97.5 °F (36.4 °C)   TempSrc: Oral   SpO2: 97%   Weight: 110 kg (243 lb)   Height: 175.3 cm (69\")       Patient's Body mass index is 35.88 kg/m². BMI is above normal parameters. Recommendations include: educational material, exercise counseling and nutrition counseling.      No exam data present    The patient has no evidence of cognitve impairment.     Physical Exam    Recent Lab Results:  Lab Results   Component Value Date    GLU 78 12/11/2019          Assessment/Plan   Age-appropriate Screening Schedule:  Refer to the list below for future screening recommendations based on patient's age, sex and/or medical conditions.      Health Maintenance   Topic Date Due   • MAMMOGRAM  1960   • TDAP/TD VACCINES (1 - Tdap) 07/01/1973   • ZOSTER VACCINE (1 of 2) 07/01/2012   • PAP SMEAR  12/11/2019   • COLONOSCOPY  12/11/2019   • INFLUENZA VACCINE  Addressed       Medicare Risks and Personalized Health Plan:  Obesity/Overweight       CMS-Preventive Services Quick Reference  Medicare Preventive Services Addressed:  Annual Wellness Visit (AWV)    Advance Care Planning:  Patient does not have an advance directive - information provided to the patient today    There are no diagnoses linked to this encounter.    An After Visit Summary and PPPS with all of these plans were given to the patient.      Follow Up:  No follow-ups on file.                                            "

## 2019-12-24 LAB
ALBUMIN SERPL-MCNC: 4.3 G/DL (ref 3.5–5.2)
ALBUMIN/GLOB SERPL: 1.7 G/DL
ALP SERPL-CCNC: 65 U/L (ref 39–117)
ALT SERPL-CCNC: 20 U/L (ref 1–33)
AST SERPL-CCNC: 23 U/L (ref 1–32)
BILIRUB SERPL-MCNC: 0.3 MG/DL (ref 0.2–1.2)
BUN SERPL-MCNC: 15 MG/DL (ref 6–20)
BUN/CREAT SERPL: 17.6 (ref 7–25)
CALCIUM SERPL-MCNC: 9.5 MG/DL (ref 8.6–10.5)
CHLORIDE SERPL-SCNC: 102 MMOL/L (ref 98–107)
CO2 SERPL-SCNC: 25.9 MMOL/L (ref 22–29)
CREAT SERPL-MCNC: 0.85 MG/DL (ref 0.57–1)
GLOBULIN SER CALC-MCNC: 2.6 GM/DL
GLUCOSE SERPL-MCNC: 100 MG/DL (ref 65–99)
POTASSIUM SERPL-SCNC: 4.2 MMOL/L (ref 3.5–5.2)
PROT SERPL-MCNC: 6.9 G/DL (ref 6–8.5)
SODIUM SERPL-SCNC: 144 MMOL/L (ref 136–145)

## 2019-12-30 ENCOUNTER — TELEPHONE (OUTPATIENT)
Dept: FAMILY MEDICINE CLINIC | Facility: CLINIC | Age: 59
End: 2019-12-30

## 2019-12-30 DIAGNOSIS — M79.604 PAIN IN BOTH LOWER EXTREMITIES: ICD-10-CM

## 2019-12-30 DIAGNOSIS — T84.84XA PAINFUL ORTHOPAEDIC HARDWARE (HCC): ICD-10-CM

## 2019-12-30 DIAGNOSIS — M54.42 CHRONIC BILATERAL LOW BACK PAIN WITH BILATERAL SCIATICA: ICD-10-CM

## 2019-12-30 DIAGNOSIS — G89.29 CHRONIC BILATERAL LOW BACK PAIN WITH BILATERAL SCIATICA: ICD-10-CM

## 2019-12-30 DIAGNOSIS — M51.36 DEGENERATION OF LUMBAR INTERVERTEBRAL DISC: Primary | ICD-10-CM

## 2019-12-30 DIAGNOSIS — M47.816 LUMBAR SPONDYLOSIS: ICD-10-CM

## 2019-12-30 DIAGNOSIS — M54.41 CHRONIC BILATERAL LOW BACK PAIN WITH BILATERAL SCIATICA: ICD-10-CM

## 2019-12-30 DIAGNOSIS — M79.605 PAIN IN BOTH LOWER EXTREMITIES: ICD-10-CM

## 2019-12-30 DIAGNOSIS — M77.8 CAPSULITIS OF SHOULDER, UNSPECIFIED LATERALITY: ICD-10-CM

## 2020-01-07 ENCOUNTER — APPOINTMENT (OUTPATIENT)
Dept: CARDIOLOGY | Facility: HOSPITAL | Age: 60
End: 2020-01-07

## 2020-01-07 ENCOUNTER — HOSPITAL ENCOUNTER (OUTPATIENT)
Dept: MAMMOGRAPHY | Facility: HOSPITAL | Age: 60
Discharge: HOME | End: 2020-01-07
Admitting: FAMILY MEDICINE

## 2020-01-07 DIAGNOSIS — Z12.31 SCREENING MAMMOGRAM, ENCOUNTER FOR: ICD-10-CM

## 2020-01-07 DIAGNOSIS — R92.2 INCONCLUSIVE MAMMOGRAM: ICD-10-CM

## 2020-01-07 DIAGNOSIS — N63.0 BREAST NODULE: Primary | ICD-10-CM

## 2020-01-07 PROCEDURE — 77067 SCR MAMMO BI INCL CAD: CPT

## 2020-01-07 NOTE — PROGRESS NOTES
Please call the patient regarding her result(s).  I called the patient and let her know that her mammogram showed a right breast nodule and that she needs a diagnostic mammogram and ultrasound.  She agrees to schedule that when they call her.

## 2020-01-10 ENCOUNTER — HOSPITAL ENCOUNTER (OUTPATIENT)
Dept: MAMMOGRAPHY | Facility: HOSPITAL | Age: 60
Discharge: HOME | End: 2020-01-10

## 2020-01-10 ENCOUNTER — HOSPITAL ENCOUNTER (OUTPATIENT)
Dept: ULTRASOUND IMAGING | Facility: HOSPITAL | Age: 60
Discharge: HOME | End: 2020-01-10
Admitting: FAMILY MEDICINE

## 2020-01-10 ENCOUNTER — TELEPHONE (OUTPATIENT)
Dept: FAMILY MEDICINE CLINIC | Facility: CLINIC | Age: 60
End: 2020-01-10

## 2020-01-10 ENCOUNTER — APPOINTMENT (OUTPATIENT)
Dept: PHYSICAL THERAPY | Facility: HOSPITAL | Age: 60
End: 2020-01-10

## 2020-01-10 DIAGNOSIS — N63.0 BREAST NODULE: ICD-10-CM

## 2020-01-10 DIAGNOSIS — R92.8 ABNORMAL MAMMOGRAM: Primary | ICD-10-CM

## 2020-01-10 DIAGNOSIS — R92.2 INCONCLUSIVE MAMMOGRAM: ICD-10-CM

## 2020-01-10 PROCEDURE — 77065 DX MAMMO INCL CAD UNI: CPT

## 2020-01-10 PROCEDURE — G0279 TOMOSYNTHESIS, MAMMO: HCPCS

## 2020-01-10 PROCEDURE — 76642 ULTRASOUND BREAST LIMITED: CPT

## 2020-01-10 NOTE — ASSESSMENT & PLAN NOTE
Although there were no areas that were distinctly concerning for cancer that showed up on the mammogram or ultrasound, I am referring her to Dr. Jadyn Snider, breast specialist, for further evaluation and management.

## 2020-01-10 NOTE — PROGRESS NOTES
Please call the patient regarding her result(s).  Please let her know that she has some abnormalities on her mammogram that do not reveal any distinct concern for cancer, however, I have made a referral to Dr. Jadyn Snider, breast specialist, for further evaluation and management.

## 2020-01-10 NOTE — TELEPHONE ENCOUNTER
Patient called today regarding cost of physical therapy. She says she can not afford $40 three times a week to continue.

## 2020-01-13 ENCOUNTER — OFFICE VISIT (OUTPATIENT)
Dept: FAMILY MEDICINE CLINIC | Facility: CLINIC | Age: 60
End: 2020-01-13

## 2020-01-13 VITALS
SYSTOLIC BLOOD PRESSURE: 108 MMHG | OXYGEN SATURATION: 94 % | HEART RATE: 91 BPM | HEIGHT: 69 IN | WEIGHT: 241 LBS | DIASTOLIC BLOOD PRESSURE: 70 MMHG | TEMPERATURE: 97.7 F | BODY MASS INDEX: 35.7 KG/M2

## 2020-01-13 DIAGNOSIS — R60.0 LOWER EXTREMITY EDEMA: Primary | ICD-10-CM

## 2020-01-13 DIAGNOSIS — M54.41 CHRONIC BILATERAL LOW BACK PAIN WITH BILATERAL SCIATICA: ICD-10-CM

## 2020-01-13 DIAGNOSIS — E78.2 MIXED HYPERLIPIDEMIA: ICD-10-CM

## 2020-01-13 DIAGNOSIS — R92.8 ABNORMAL MAMMOGRAM: ICD-10-CM

## 2020-01-13 DIAGNOSIS — E66.01 CLASS 2 SEVERE OBESITY DUE TO EXCESS CALORIES WITH SERIOUS COMORBIDITY AND BODY MASS INDEX (BMI) OF 37.0 TO 37.9 IN ADULT (HCC): ICD-10-CM

## 2020-01-13 DIAGNOSIS — M54.42 CHRONIC BILATERAL LOW BACK PAIN WITH BILATERAL SCIATICA: ICD-10-CM

## 2020-01-13 DIAGNOSIS — G89.29 CHRONIC BILATERAL LOW BACK PAIN WITH BILATERAL SCIATICA: ICD-10-CM

## 2020-01-13 PROBLEM — Z79.899 HIGH RISK MEDICATION USE: Status: ACTIVE | Noted: 2020-01-13

## 2020-01-13 PROBLEM — Z59.00 HOMELESS SINGLE PERSON: Status: RESOLVED | Noted: 2019-12-11 | Resolved: 2020-01-13

## 2020-01-13 PROBLEM — Z82.49 FAMILY HISTORY OF HEART DISEASE: Status: ACTIVE | Noted: 2020-01-13

## 2020-01-13 PROCEDURE — 99214 OFFICE O/P EST MOD 30 MIN: CPT | Performed by: FAMILY MEDICINE

## 2020-01-13 RX ORDER — ATORVASTATIN CALCIUM 10 MG/1
10 TABLET, FILM COATED ORAL DAILY
Qty: 90 TABLET | Refills: 1 | Status: SHIPPED | OUTPATIENT
Start: 2020-01-13 | End: 2020-05-21 | Stop reason: SDUPTHER

## 2020-01-13 NOTE — PATIENT INSTRUCTIONS

## 2020-01-13 NOTE — ASSESSMENT & PLAN NOTE
Following with pain management on [facility].  She believes she is having a stimulator placed on February 7, 2020.

## 2020-01-13 NOTE — ASSESSMENT & PLAN NOTE
Bilateral lower extremity edema continues to improve.  She is currently taking Lasix 40 mg daily and potassium 20 mEq daily.  Check potassium level today.

## 2020-01-13 NOTE — PROGRESS NOTES
Subjective   Jaye Rivas is a 59 y.o. female is here for   Chief Complaint   Patient presents with   • Edema     getting better       History of Present Illness     Pt is here for follow-up of her lower extremity edema, which she states is much better.    She has obesity and is losing weight via diet and exercise.    She has chronic back pain and is following with pain management.    She also recently had an abnormal mammogram.    The following portions of the patient's history were reviewed and updated as appropriate: allergies, current medications, past family history, past medical history, past social history, past surgical history and problem list.     reports that she has never smoked. She has never used smokeless tobacco. She reports that she does not drink alcohol or use drugs.    Review of Systems   Constitutional: Negative for activity change and unexpected weight change.   HENT: Negative for congestion.    Respiratory: Positive for shortness of breath. Negative for wheezing.    Cardiovascular: Negative for chest pain and palpitations.   Gastrointestinal: Negative for abdominal pain, blood in stool and constipation.   Genitourinary: Negative for difficulty urinating and hematuria.   Musculoskeletal: Positive for gait problem.        Bilateral lower extremity edema is markedly improved since last exam, now trace to 1+ bilateral lower extremity edema.   Skin: Negative for color change and rash.        PHQ-9 Depression Screening  Little interest or pleasure in doing things?     Feeling down, depressed, or hopeless?     Trouble falling or staying asleep, or sleeping too much?     Feeling tired or having little energy?     Poor appetite or overeating?     Feeling bad about yourself - or that you are a failure or have let yourself or your family down?     Trouble concentrating on things, such as reading the newspaper or watching television?     Moving or speaking so slowly that other people could have noticed? Or  "the opposite - being so fidgety or restless that you have been moving around a lot more than usual?     Thoughts that you would be better off dead, or of hurting yourself in some way?     PHQ-9 Total Score     If you checked off any problems, how difficult have these problems made it for you to do your work, take care of things at home, or get along with other people?           Objective   /70 (BP Location: Right arm, Patient Position: Sitting, Cuff Size: Adult)   Pulse 91   Temp 97.7 °F (36.5 °C)   Ht 175.3 cm (69\")   Wt 109 kg (241 lb)   SpO2 94%   BMI 35.59 kg/m²   Physical Exam   Constitutional: She is oriented to person, place, and time. She appears well-developed and well-nourished. No distress.   HENT:   Head: Normocephalic and atraumatic.   Right Ear: External ear normal.   Left Ear: External ear normal.   Nose: Nose normal.   Mouth/Throat: Oropharynx is clear and moist. No oropharyngeal exudate.   Eyes: Lids are normal. Right eye exhibits no discharge. Left eye exhibits no discharge. No scleral icterus.   Neck: Trachea normal, normal range of motion and full passive range of motion without pain. Neck supple. No tracheal deviation and no edema present. No thyromegaly present.   Cardiovascular: Normal rate, regular rhythm, normal heart sounds and intact distal pulses. Exam reveals no gallop and no friction rub.   No murmur heard.  Pulmonary/Chest: Effort normal and breath sounds normal. No stridor. No tachypnea and no bradypnea. No respiratory distress. She has no decreased breath sounds. She has no wheezes. She has no rales. She exhibits no tenderness.   Abdominal: Normal appearance. There is no hepatosplenomegaly.   Musculoskeletal: She exhibits no edema.   Lymphadenopathy:        Head (right side): No submental, no submandibular, no tonsillar, no preauricular, no posterior auricular and no occipital adenopathy present.        Head (left side): No submental, no submandibular, no tonsillar, no " preauricular, no posterior auricular and no occipital adenopathy present.     She has no cervical adenopathy.        Right cervical: No superficial cervical, no deep cervical and no posterior cervical adenopathy present.       Left cervical: No superficial cervical, no deep cervical and no posterior cervical adenopathy present.   Neurological: She is alert and oriented to person, place, and time. She has normal strength and normal reflexes. She is not disoriented.   Skin: Skin is warm, dry and intact. Capillary refill takes less than 2 seconds. No rash noted. She is not diaphoretic. No cyanosis or erythema. No pallor. Nails show no clubbing.   Psychiatric: She has a normal mood and affect. Her behavior is normal. Cognition and memory are normal.   Nursing note and vitals reviewed.      Procedures    Assessment/Plan   Diagnoses and all orders for this visit:    1. Lower extremity edema (Primary)  Assessment & Plan:  Bilateral lower extremity edema continues to improve.  She is currently taking Lasix 40 mg daily and potassium 20 mEq daily.  Check potassium level today.    Orders:  -     Comprehensive Metabolic Panel    2. Class 2 severe obesity due to excess calories with serious comorbidity and body mass index (BMI) of 37.0 to 37.9 in adult (CMS/[facility])  Assessment & Plan:  Lost 2 pounds since last visit. Working on losing weight through diet and exercising.        3. Chronic bilateral low back pain with bilateral sciatica  Assessment & Plan:  Following with pain management on [facility].  She believes she is having a stimulator placed on February 7, 2020.        4. Mixed hyperlipidemia  Assessment & Plan:  .  Family history of heart disease, father.  Start Lipitor 10 mg daily.      Orders:  -     atorvastatin (LIPITOR) 10 MG tablet; Take 1 tablet by mouth Daily.  Dispense: 90 tablet; Refill: 1    5. Abnormal mammogram  Assessment & Plan:  I informed Ms. Rivas of her abnormal mammogram and that I have referred  her to Dr. Snider, breast specialist.

## 2020-01-13 NOTE — ASSESSMENT & PLAN NOTE
I informed Ms. Rivas of her abnormal mammogram and that I have referred her to Dr. Snider, breast specialist.

## 2020-01-13 NOTE — ASSESSMENT & PLAN NOTE
She now lives at [address] in [place] and was assisted by Cora and another lady to obtain this apartment.

## 2020-01-14 ENCOUNTER — TELEPHONE (OUTPATIENT)
Dept: SURGERY | Facility: CLINIC | Age: 60
End: 2020-01-14

## 2020-01-14 LAB
ALBUMIN SERPL-MCNC: 4.9 G/DL (ref 3.5–5.2)
ALBUMIN/GLOB SERPL: 1.8 G/DL
ALP SERPL-CCNC: 69 U/L (ref 39–117)
ALT SERPL-CCNC: 12 U/L (ref 1–33)
AST SERPL-CCNC: 15 U/L (ref 1–32)
BILIRUB SERPL-MCNC: 0.6 MG/DL (ref 0.2–1.2)
BUN SERPL-MCNC: 19 MG/DL (ref 6–20)
BUN/CREAT SERPL: 23.8 (ref 7–25)
CALCIUM SERPL-MCNC: 10.4 MG/DL (ref 8.6–10.5)
CHLORIDE SERPL-SCNC: 99 MMOL/L (ref 98–107)
CO2 SERPL-SCNC: 30.7 MMOL/L (ref 22–29)
CREAT SERPL-MCNC: 0.8 MG/DL (ref 0.57–1)
GLOBULIN SER CALC-MCNC: 2.7 GM/DL
GLUCOSE SERPL-MCNC: 97 MG/DL (ref 65–99)
POTASSIUM SERPL-SCNC: 4.8 MMOL/L (ref 3.5–5.2)
PROT SERPL-MCNC: 7.6 G/DL (ref 6–8.5)
SODIUM SERPL-SCNC: 140 MMOL/L (ref 136–145)

## 2020-01-14 NOTE — TELEPHONE ENCOUNTER
New patient appointment with Dr. Snider is scheduled on 2/13/2020 @ 11:00am.    Called and spoke to patient, patient expressed verbal understanding of appointment time.

## 2020-01-14 NOTE — PROGRESS NOTES
Please call and let the patient know her potassium level as well as her liver and kidney function are normal.

## 2020-02-04 ENCOUNTER — HOSPITAL ENCOUNTER (OUTPATIENT)
Dept: CARDIOLOGY | Facility: HOSPITAL | Age: 60
Discharge: HOME | End: 2020-02-04
Admitting: FAMILY MEDICINE

## 2020-02-04 DIAGNOSIS — Z00.00 ENCOUNTER FOR WELL ADULT EXAM WITHOUT ABNORMAL FINDINGS: ICD-10-CM

## 2020-02-04 DIAGNOSIS — R00.2 PALPITATIONS: ICD-10-CM

## 2020-02-04 PROBLEM — I34.0 TRACE MITRAL VALVE REGURGITATION: Status: ACTIVE | Noted: 2020-02-04

## 2020-02-04 LAB
BH CV ECHO MEAS - ACS: 2.3 CM
BH CV ECHO MEAS - AO MAX PG (FULL): 0.61 MMHG
BH CV ECHO MEAS - AO MAX PG: 6.9 MMHG
BH CV ECHO MEAS - AO MEAN PG (FULL): 0 MMHG
BH CV ECHO MEAS - AO MEAN PG: 3 MMHG
BH CV ECHO MEAS - AO ROOT AREA (BSA CORRECTED): 1.1
BH CV ECHO MEAS - AO ROOT AREA: 4.9 CM^2
BH CV ECHO MEAS - AO ROOT DIAM: 2.5 CM
BH CV ECHO MEAS - AO V2 MAX: 131 CM/SEC
BH CV ECHO MEAS - AO V2 MEAN: 80.4 CM/SEC
BH CV ECHO MEAS - AO V2 VTI: 26.2 CM
BH CV ECHO MEAS - ASC AORTA: 3.5 CM
BH CV ECHO MEAS - AVA(I,A): 3.2 CM^2
BH CV ECHO MEAS - AVA(I,D): 3.2 CM^2
BH CV ECHO MEAS - AVA(V,A): 3.6 CM^2
BH CV ECHO MEAS - AVA(V,D): 3.6 CM^2
BH CV ECHO MEAS - BSA(HAYCOCK): 2.3 M^2
BH CV ECHO MEAS - BSA: 2.2 M^2
BH CV ECHO MEAS - BZI_BMI: 34 KILOGRAMS/M^2
BH CV ECHO MEAS - BZI_METRIC_HEIGHT: 175.3 CM
BH CV ECHO MEAS - BZI_METRIC_WEIGHT: 104.3 KG
BH CV ECHO MEAS - EDV(CUBED): 162.8 ML
BH CV ECHO MEAS - EDV(MOD-SP2): 54.4 ML
BH CV ECHO MEAS - EDV(MOD-SP4): 67.5 ML
BH CV ECHO MEAS - EDV(TEICH): 145 ML
BH CV ECHO MEAS - EF(CUBED): 77.9 %
BH CV ECHO MEAS - EF(MOD-BP): 74 %
BH CV ECHO MEAS - EF(MOD-SP2): 73 %
BH CV ECHO MEAS - EF(MOD-SP4): 73.6 %
BH CV ECHO MEAS - EF(TEICH): 69.6 %
BH CV ECHO MEAS - ESV(CUBED): 35.9 ML
BH CV ECHO MEAS - ESV(MOD-SP2): 14.7 ML
BH CV ECHO MEAS - ESV(MOD-SP4): 17.8 ML
BH CV ECHO MEAS - ESV(TEICH): 44.1 ML
BH CV ECHO MEAS - FS: 39.6 %
BH CV ECHO MEAS - IVS/LVPW: 1
BH CV ECHO MEAS - IVSD: 0.79 CM
BH CV ECHO MEAS - LAT PEAK E' VEL: 9.7 CM/SEC
BH CV ECHO MEAS - LV DIASTOLIC VOL/BSA (35-75): 30.8 ML/M^2
BH CV ECHO MEAS - LV MASS(C)D: 154.9 GRAMS
BH CV ECHO MEAS - LV MASS(C)DI: 70.6 GRAMS/M^2
BH CV ECHO MEAS - LV MAX PG: 6.3 MMHG
BH CV ECHO MEAS - LV MEAN PG: 3 MMHG
BH CV ECHO MEAS - LV SYSTOLIC VOL/BSA (12-30): 8.1 ML/M^2
BH CV ECHO MEAS - LV V1 MAX: 125 CM/SEC
BH CV ECHO MEAS - LV V1 MEAN: 73.7 CM/SEC
BH CV ECHO MEAS - LV V1 VTI: 22.1 CM
BH CV ECHO MEAS - LVIDD: 5.5 CM
BH CV ECHO MEAS - LVIDS: 3.3 CM
BH CV ECHO MEAS - LVLD AP2: 7.3 CM
BH CV ECHO MEAS - LVLD AP4: 6.6 CM
BH CV ECHO MEAS - LVLS AP2: 6.5 CM
BH CV ECHO MEAS - LVLS AP4: 6.1 CM
BH CV ECHO MEAS - LVOT AREA (M): 3.8 CM^2
BH CV ECHO MEAS - LVOT AREA: 3.8 CM^2
BH CV ECHO MEAS - LVOT DIAM: 2.2 CM
BH CV ECHO MEAS - LVPWD: 0.78 CM
BH CV ECHO MEAS - MED PEAK E' VEL: 7.3 CM/SEC
BH CV ECHO MEAS - MV A DUR: 0.16 SEC
BH CV ECHO MEAS - MV A MAX VEL: 58.7 CM/SEC
BH CV ECHO MEAS - MV DEC SLOPE: 368 CM/SEC^2
BH CV ECHO MEAS - MV DEC TIME: 222 SEC
BH CV ECHO MEAS - MV E MAX VEL: 59.2 CM/SEC
BH CV ECHO MEAS - MV E/A: 1
BH CV ECHO MEAS - MV MAX PG: 2.6 MMHG
BH CV ECHO MEAS - MV MEAN PG: 1 MMHG
BH CV ECHO MEAS - MV P1/2T MAX VEL: 82.8 CM/SEC
BH CV ECHO MEAS - MV P1/2T: 65.9 MSEC
BH CV ECHO MEAS - MV V2 MAX: 80.3 CM/SEC
BH CV ECHO MEAS - MV V2 MEAN: 39.5 CM/SEC
BH CV ECHO MEAS - MV V2 VTI: 25.9 CM
BH CV ECHO MEAS - MVA P1/2T LCG: 2.7 CM^2
BH CV ECHO MEAS - MVA(P1/2T): 3.3 CM^2
BH CV ECHO MEAS - MVA(VTI): 3.2 CM^2
BH CV ECHO MEAS - PA ACC TIME: 0.12 SEC
BH CV ECHO MEAS - PA MAX PG (FULL): 1.4 MMHG
BH CV ECHO MEAS - PA MAX PG: 3.4 MMHG
BH CV ECHO MEAS - PA PR(ACCEL): 26.8 MMHG
BH CV ECHO MEAS - PA V2 MAX: 92.3 CM/SEC
BH CV ECHO MEAS - PULM A REVS DUR: 0.08 SEC
BH CV ECHO MEAS - PULM A REVS VEL: 30.6 CM/SEC
BH CV ECHO MEAS - PULM DIAS VEL: 42.7 CM/SEC
BH CV ECHO MEAS - PULM S/D: 1.2
BH CV ECHO MEAS - PULM SYS VEL: 50 CM/SEC
BH CV ECHO MEAS - PVA(V,A): 3.2 CM^2
BH CV ECHO MEAS - PVA(V,D): 3.2 CM^2
BH CV ECHO MEAS - QP/QS: 0.6
BH CV ECHO MEAS - RAP SYSTOLE: 3 MMHG
BH CV ECHO MEAS - RV MAX PG: 2 MMHG
BH CV ECHO MEAS - RV MEAN PG: 1 MMHG
BH CV ECHO MEAS - RV V1 MAX: 70.3 CM/SEC
BH CV ECHO MEAS - RV V1 MEAN: 46.6 CM/SEC
BH CV ECHO MEAS - RV V1 VTI: 12.1 CM
BH CV ECHO MEAS - RVOT AREA: 4.2 CM^2
BH CV ECHO MEAS - RVOT DIAM: 2.3 CM
BH CV ECHO MEAS - RVSP: 25 MMHG
BH CV ECHO MEAS - SI(AO): 58.7 ML/M^2
BH CV ECHO MEAS - SI(CUBED): 57.9 ML/M^2
BH CV ECHO MEAS - SI(LVOT): 38.3 ML/M^2
BH CV ECHO MEAS - SI(MOD-SP2): 18.1 ML/M^2
BH CV ECHO MEAS - SI(MOD-SP4): 22.7 ML/M^2
BH CV ECHO MEAS - SI(TEICH): 46 ML/M^2
BH CV ECHO MEAS - SV(AO): 128.6 ML
BH CV ECHO MEAS - SV(CUBED): 126.8 ML
BH CV ECHO MEAS - SV(LVOT): 84 ML
BH CV ECHO MEAS - SV(MOD-SP2): 39.7 ML
BH CV ECHO MEAS - SV(MOD-SP4): 49.7 ML
BH CV ECHO MEAS - SV(RVOT): 50.3 ML
BH CV ECHO MEAS - SV(TEICH): 100.8 ML
BH CV ECHO MEAS - TAPSE (>1.6): 2.1 CM
BH CV ECHO MEAS - TR MAX VEL: 231.8 CM/SEC
BH CV ECHO MEASUREMENTS AVERAGE E/E' RATIO: 6.96
BH CV XLRA - RV BASE: 2.9 CM
BH CV XLRA - TDI S': 16 CM/SEC
LEFT ATRIUM VOLUME INDEX: 15.6 ML/M2
MAXIMAL PREDICTED HEART RATE: 163 BPM
STRESS TARGET HR: 139 BPM

## 2020-02-04 PROCEDURE — 93306 TTE W/DOPPLER COMPLETE: CPT

## 2020-02-04 PROCEDURE — 93306 TTE W/DOPPLER COMPLETE: CPT | Performed by: INTERNAL MEDICINE

## 2020-02-04 NOTE — PROGRESS NOTES
Please call the patient regarding her result(s).  Please let her know that she had only a trace amount of regurgitation in 1 of her heart valves, and otherwise, her echocardiogram was normal.  No further testing or intervention is necessary at this time.

## 2020-02-10 ENCOUNTER — OFFICE VISIT (OUTPATIENT)
Dept: FAMILY MEDICINE CLINIC | Facility: CLINIC | Age: 60
End: 2020-02-10

## 2020-02-10 ENCOUNTER — TELEPHONE (OUTPATIENT)
Dept: FAMILY MEDICINE CLINIC | Facility: CLINIC | Age: 60
End: 2020-02-10

## 2020-02-10 VITALS
DIASTOLIC BLOOD PRESSURE: 60 MMHG | TEMPERATURE: 97.5 F | HEART RATE: 60 BPM | WEIGHT: 234 LBS | OXYGEN SATURATION: 97 % | BODY MASS INDEX: 34.66 KG/M2 | HEIGHT: 69 IN | SYSTOLIC BLOOD PRESSURE: 100 MMHG

## 2020-02-10 DIAGNOSIS — R60.0 LOWER EXTREMITY EDEMA: ICD-10-CM

## 2020-02-10 DIAGNOSIS — E78.2 MIXED HYPERLIPIDEMIA: ICD-10-CM

## 2020-02-10 DIAGNOSIS — G89.29 CHRONIC BILATERAL LOW BACK PAIN WITH BILATERAL SCIATICA: Primary | ICD-10-CM

## 2020-02-10 DIAGNOSIS — M54.41 CHRONIC BILATERAL LOW BACK PAIN WITH BILATERAL SCIATICA: Primary | ICD-10-CM

## 2020-02-10 DIAGNOSIS — I73.9 INTERMITTENT CLAUDICATION (HCC): ICD-10-CM

## 2020-02-10 DIAGNOSIS — M54.42 CHRONIC BILATERAL LOW BACK PAIN WITH BILATERAL SCIATICA: Primary | ICD-10-CM

## 2020-02-10 PROBLEM — R11.2 NON-INTRACTABLE VOMITING WITH NAUSEA: Status: ACTIVE | Noted: 2020-02-10

## 2020-02-10 PROCEDURE — 99214 OFFICE O/P EST MOD 30 MIN: CPT | Performed by: FAMILY MEDICINE

## 2020-02-10 RX ORDER — FUROSEMIDE 20 MG/1
20 TABLET ORAL DAILY
Qty: 90 TABLET | Refills: 1 | Status: SHIPPED | OUTPATIENT
Start: 2020-02-10 | End: 2020-05-21 | Stop reason: SDUPTHER

## 2020-02-10 RX ORDER — POTASSIUM CHLORIDE 750 MG/1
10 TABLET, FILM COATED, EXTENDED RELEASE ORAL DAILY
Qty: 90 TABLET | Refills: 1 | Status: SHIPPED | OUTPATIENT
Start: 2020-02-10 | End: 2020-05-21 | Stop reason: SDUPTHER

## 2020-02-10 NOTE — ASSESSMENT & PLAN NOTE
Controlled with atorvastatin 10 mg daily.  No medication side effects.  Continue current treatment.

## 2020-02-10 NOTE — PROGRESS NOTES
Subjective   Jaye Rivas is a 59 y.o. female is here for   Chief Complaint   Patient presents with   • Hypertension   • Nausea   • Fatigue   • Shortness of Breath   • Dizziness     feels very unsteady while walkintg       History of Present Illness     Ms. Welch states that yesterday around 7 PM, while she was sitting down talking to her friends, she developed nausea and vomited.  She states in the past that for the past 10-15 years she has been sensitive to warm/hot and cold weather, and that if she goes outside out when it is maura or hot, or if it is cold, her blood pressure would go up, and she gets nauseated and vomit.  She states yesterday her blood pressure went up to around 140 when she got nauseated and vomited.  She states the weather was good yesterday and was not hot, so she is not sure if the weather had anything to do this episode or not.    She has bilateral lower extremity edema.  She has not had any swelling in her legs.  Her blood pressure today is 100/60 and her pulse rate was between 47-60.    She also has hyperlipidemia and is taking Lipitor 10 mg daily.    She states about 2 years ago she saw a provider in [place], through [facility], and had a CT scan of her head done at [facility] in [place].  She does not know the results of that test.    She missed her appointment to have a stimulator placed on 2-7-2020.    She states she is terrified of big cities and she cancelled her appointment wit pain management because she could not find anyone to  her to her appointment in [place].    The following portions of the patient's history were reviewed and updated as appropriate: allergies, current medications, past family history, past medical history, past social history, past surgical history and problem list.     reports that she has never smoked. She has never used smokeless tobacco. She reports that she does not drink alcohol or use drugs.    Review of Systems  "  Constitutional: Positive for fatigue. Negative for activity change and unexpected weight change.   Respiratory: Positive for shortness of breath. Negative for wheezing.    Cardiovascular: Negative for chest pain and palpitations.   Gastrointestinal: Negative for abdominal pain, blood in stool and constipation.   Genitourinary: Negative for difficulty urinating and hematuria.   Musculoskeletal: Negative for gait problem.   Skin: Negative for color change and rash.   Neurological: Positive for dizziness, syncope and light-headedness.        PHQ-9 Depression Screening  Little interest or pleasure in doing things?     Feeling down, depressed, or hopeless?     Trouble falling or staying asleep, or sleeping too much?     Feeling tired or having little energy?     Poor appetite or overeating?     Feeling bad about yourself - or that you are a failure or have let yourself or your family down?     Trouble concentrating on things, such as reading the newspaper or watching television?     Moving or speaking so slowly that other people could have noticed? Or the opposite - being so fidgety or restless that you have been moving around a lot more than usual?     Thoughts that you would be better off dead, or of hurting yourself in some way?     PHQ-9 Total Score     If you checked off any problems, how difficult have these problems made it for you to do your work, take care of things at home, or get along with other people?           Objective   /60 (BP Location: Right arm, Patient Position: Sitting, Cuff Size: Adult)   Pulse 60   Temp 97.5 °F (36.4 °C)   Ht 175.3 cm (69\")   Wt 106 kg (234 lb)   SpO2 97%   BMI 34.56 kg/m²   Physical Exam   Constitutional: She is oriented to person, place, and time. She appears well-developed and well-nourished. No distress.   HENT:   Head: Normocephalic and atraumatic.   Right Ear: External ear normal.   Left Ear: External ear normal.   Nose: Nose normal.   Mouth/Throat: Oropharynx " is clear and moist. No oropharyngeal exudate.   Eyes: Lids are normal. Right eye exhibits no discharge. Left eye exhibits no discharge. No scleral icterus.   Neck: Trachea normal, normal range of motion and full passive range of motion without pain. Neck supple. No tracheal deviation and no edema present. No thyromegaly present.   Cardiovascular: Normal rate, regular rhythm, normal heart sounds and intact distal pulses. Exam reveals no gallop and no friction rub.   No murmur heard.  Pulmonary/Chest: Effort normal and breath sounds normal. No stridor. No tachypnea and no bradypnea. No respiratory distress. She has no decreased breath sounds. She has no wheezes. She has no rales. She exhibits no tenderness.   Abdominal: Normal appearance. There is no hepatosplenomegaly.   Musculoskeletal: She exhibits no edema.   Lymphadenopathy:        Head (right side): No submental, no submandibular, no tonsillar, no preauricular, no posterior auricular and no occipital adenopathy present.        Head (left side): No submental, no submandibular, no tonsillar, no preauricular, no posterior auricular and no occipital adenopathy present.     She has no cervical adenopathy.        Right cervical: No superficial cervical, no deep cervical and no posterior cervical adenopathy present.       Left cervical: No superficial cervical, no deep cervical and no posterior cervical adenopathy present.   Neurological: She is alert and oriented to person, place, and time. She has normal strength and normal reflexes. She is not disoriented.   Skin: Skin is warm, dry and intact. Capillary refill takes less than 2 seconds. No rash noted. She is not diaphoretic. No cyanosis or erythema. No pallor. Nails show no clubbing.   Psychiatric: She has a normal mood and affect. Her behavior is normal. Cognition and memory are normal.   Nursing note and vitals reviewed.      Procedures    Assessment/Plan   Diagnoses and all orders for this visit:    1. Chronic  bilateral low back pain with bilateral sciatica (Primary)  Assessment & Plan:  She is going to see if she can get transportation lined up and reschedule the stimulator placement and to continue following with pain management.        2. Lower extremity edema  Assessment & Plan:  Decrease Lasix from 40 to 20 mg daily and decrease potassium from 20 to 10 mEq daily.      Orders:  -     furosemide (LASIX) 20 MG tablet; Take 1 tablet by mouth Daily.  Dispense: 90 tablet; Refill: 1  -     potassium chloride (K-DUR) 10 MEQ CR tablet; Take 1 tablet by mouth Daily.  Dispense: 90 tablet; Refill: 1    3. Mixed hyperlipidemia  Assessment & Plan:  Controlled with atorvastatin 10 mg daily.  No medication side effects.  Continue current treatment.      4. Intermittent claudication (CMS/HCC)

## 2020-02-10 NOTE — ASSESSMENT & PLAN NOTE
She is going to see if she can get transportation lined up and reschedule the stimulator placement and to continue following with pain management.

## 2020-02-10 NOTE — TELEPHONE ENCOUNTER
Pt called in this morning to see if she can be seen today for a same day visit, having symptoms of light headedness, nausea/vomitting, unsteady when walking. Pt stated that she thinks it may be her blood pressure. Pt scheduled for the 12pm slot.

## 2020-02-10 NOTE — PATIENT INSTRUCTIONS

## 2020-02-18 ENCOUNTER — TELEPHONE (OUTPATIENT)
Dept: FAMILY MEDICINE CLINIC | Facility: CLINIC | Age: 60
End: 2020-02-18

## 2020-02-18 NOTE — TELEPHONE ENCOUNTER
I called patient to find out why she candled her appt.  She stated she is just burnt out.  She does not have the money and she is working on getting one of her dogs back.  She stated she is very tired all the time and does not sleep at night.  She stated she has some dizziness and vision problems.  She feels like this may be caused by her BP.  She stated she told Dr. Hermosillo this but he would not give her any medication to help her sleep.  I ask her if she had ever seen anyone for sleeping issues.  She stated yes, she did testing and was given a CPAP.  She does not use it as she does not like anything on her face.  She stated that  Did not give her anything to help her sleep.  I advised her she really should follow up with our office, especially since she was having dizzyiness and vision disturbance.  She stated again she is just burnt out right now and does not want to

## 2020-02-26 ENCOUNTER — OFFICE VISIT (OUTPATIENT)
Dept: FAMILY MEDICINE CLINIC | Facility: CLINIC | Age: 60
End: 2020-02-26

## 2020-02-26 VITALS
TEMPERATURE: 98.1 F | SYSTOLIC BLOOD PRESSURE: 158 MMHG | DIASTOLIC BLOOD PRESSURE: 92 MMHG | WEIGHT: 239 LBS | BODY MASS INDEX: 35.4 KG/M2 | HEIGHT: 69 IN | OXYGEN SATURATION: 98 % | HEART RATE: 141 BPM

## 2020-02-26 DIAGNOSIS — F33.1 MODERATE EPISODE OF RECURRENT MAJOR DEPRESSIVE DISORDER (HCC): Primary | ICD-10-CM

## 2020-02-26 PROCEDURE — 99214 OFFICE O/P EST MOD 30 MIN: CPT | Performed by: FAMILY MEDICINE

## 2020-02-26 RX ORDER — FLUOXETINE HYDROCHLORIDE 20 MG/1
20 CAPSULE ORAL DAILY
Qty: 90 CAPSULE | Refills: 1 | Status: SHIPPED | OUTPATIENT
Start: 2020-02-26 | End: 2020-05-21 | Stop reason: SDUPTHER

## 2020-02-26 NOTE — ASSESSMENT & PLAN NOTE
Start Prozac 20 mg once daily.  Follow-up in 2 to 4 weeks.  Refer to psychology.  Patient is can call [facility] and make an appointment.

## 2020-02-26 NOTE — PATIENT INSTRUCTIONS
Call [facility] in [place] and set up an appointment, or go to their walk-in clinic appointment times.  The address is [address] [place] [address], [place] [address].  The phone number is [phone].   Walk-in hours are as follows:    Monday: 8:30 am - 2:30 pm  Tuesday:8:30 am - 2:30 pm  Wednesday:8:30 am - 2:30 pm  Thursday:8:30 am - 2:30 pm    Major Depressive Disorder, Adult  Major depressive disorder (MDD) is a mental health condition. MDD often makes you feel sad, hopeless, or helpless. MDD can also cause symptoms in your body. MDD can affect your:  · Work.  · School.  · Relationships.  · Other normal activities.  MDD can range from mild to very bad. It may occur once (single episode MDD). It can also occur many times (recurrent MDD).  The main symptoms of MDD often include:  · Feeling sad, depressed, or irritable most of the time.  · Loss of interest.  MDD symptoms also include:  · Sleeping too much or too little.  · Eating too much or too little.  · A change in your weight.  · Feeling tired (fatigue) or having low energy.  · Feeling worthless.  · Feeling guilty.  · Trouble making decisions.  · Trouble thinking clearly.  · Thoughts of suicide or harming others.  · Feeling weak.  · Feeling agitated.  · Keeping yourself from being around other people (isolation).  Follow these instructions at home:  Activity  · Do these things as told by your doctor:  ? Go back to your normal activities.  ? Exercise regularly.  ? Spend time outdoors.  Alcohol  · Talk with your doctor about how alcohol can affect your antidepressant medicines.  · Do not drink alcohol. Or, limit how much alcohol you drink.  ? This means no more than 1 drink a day for nonpregnant women and 2 drinks a day for men. One drink equals one of these:  § 12 oz of beer.  § 5 oz of wine.  § 1½ oz of hard liquor.  General instructions  · Take over-the-counter and prescription medicines only as told by your doctor.  · Eat a healthy diet.  · Get plenty  of sleep.  · Find activities that you enjoy. Make time to do them.  · Think about joining a support group. Your doctor may be able to suggest a group for you.  · Keep all follow-up visits as told by your doctor. This is important.  Where to find more information:  · National [place] on Mental Illness:  ? www.ashley.org  · [place] National [place] of Mental Health:  ? www.[facility].nih.gov  · National Suicide Prevention Lifeline:  ? 1-663.819.1895. This is free, 24-hour help.  Contact a doctor if:  · Your symptoms get worse.  · You have new symptoms.  Get help right away if:  · You self-harm.  · You see, hear, taste, smell, or feel things that are not present (hallucinate).  If you ever feel like you may hurt yourself or others, or have thoughts about taking your own life, get help right away. You can go to your nearest emergency department or call:  · Your local emergency services (911 in the [place]  · A suicide crisis helpline, such as the National Suicide Prevention Lifeline:  ? 1-557.662.8504. This is open 24 hours a day.  This information is not intended to replace advice given to you by your health care provider. Make sure you discuss any questions you have with your health care provider.  Document Released: 11/28/2016 Document Revised: 09/03/2017 Document Reviewed: 09/03/2017  [employer] Interactive Patient Education © 2020 [employer]        Major Depressive Disorder, Adult  Major depressive disorder (MDD) is a mental health condition. MDD often makes you feel sad, hopeless, or helpless. MDD can also cause symptoms in your body. MDD can affect your:  · Work.  · School.  · Relationships.  · Other normal activities.  MDD can range from mild to very bad. It may occur once (single episode MDD). It can also occur many times (recurrent MDD).  The main symptoms of MDD often include:  · Feeling sad, depressed, or irritable most of the time.  · Loss of interest.  MDD symptoms also include:  · Sleeping too much or too  little.  · Eating too much or too little.  · A change in your weight.  · Feeling tired (fatigue) or having low energy.  · Feeling worthless.  · Feeling guilty.  · Trouble making decisions.  · Trouble thinking clearly.  · Thoughts of suicide or harming others.  · Feeling weak.  · Feeling agitated.  · Keeping yourself from being around other people (isolation).  Follow these instructions at home:  Activity  · Do these things as told by your doctor:  ? Go back to your normal activities.  ? Exercise regularly.  ? Spend time outdoors.  Alcohol  · Talk with your doctor about how alcohol can affect your antidepressant medicines.  · Do not drink alcohol. Or, limit how much alcohol you drink.  ? This means no more than 1 drink a day for nonpregnant women and 2 drinks a day for men. One drink equals one of these:  § 12 oz of beer.  § 5 oz of wine.  § 1½ oz of hard liquor.  General instructions  · Take over-the-counter and prescription medicines only as told by your doctor.  · Eat a healthy diet.  · Get plenty of sleep.  · Find activities that you enjoy. Make time to do them.  · Think about joining a support group. Your doctor may be able to suggest a group for you.  · Keep all follow-up visits as told by your doctor. This is important.  Where to find more information:  · National [place] on Mental Illness:  ? www.ashley.org  · [place] National [place] of Mental Health:  ? www.nimh.nih.gov  · National Suicide Prevention Lifeline:  ? 1-236.789.7736. This is free, 24-hour help.  Contact a doctor if:  · Your symptoms get worse.  · You have new symptoms.  Get help right away if:  · You self-harm.  · You see, hear, taste, smell, or feel things that are not present (hallucinate).  If you ever feel like you may hurt yourself or others, or have thoughts about taking your own life, get help right away. You can go to your nearest emergency department or call:  · Your local emergency services (911 in the [place]  · A suicide crisis  helpline, such as the National Suicide Prevention Lifeline:  ? 5-067-207-5013. This is open 24 hours a day.  This information is not intended to replace advice given to you by your health care provider. Make sure you discuss any questions you have with your health care provider.  Document Released: 11/28/2016 Document Revised: 09/03/2017 Document Reviewed: 09/03/2017  [employer] Interactive Patient Education © 2020 [employer]

## 2020-03-24 ENCOUNTER — TELEPHONE (OUTPATIENT)
Dept: FAMILY MEDICINE CLINIC | Facility: CLINIC | Age: 60
End: 2020-03-24

## 2020-03-25 ENCOUNTER — TELEPHONE (OUTPATIENT)
Dept: FAMILY MEDICINE CLINIC | Facility: CLINIC | Age: 60
End: 2020-03-25

## 2020-03-25 NOTE — TELEPHONE ENCOUNTER
I called patient to keep her from having to come in due to risk COVID-19 risk.  I asked her how her depression was doing. She said her depression was 10/10 before the Prozac and is now 3/10 since she has been on the Prozac. She states her ADL's were significantly affected and have now greatly improved with the medicine. No side effects. Please reschedule her appointment for 6 weeks.

## 2020-04-15 ENCOUNTER — OFFICE VISIT (OUTPATIENT)
Dept: ORTHOPEDIC SURGERY | Facility: CLINIC | Age: 60
End: 2020-04-15

## 2020-04-15 VITALS
SYSTOLIC BLOOD PRESSURE: 110 MMHG | HEART RATE: 54 BPM | BODY MASS INDEX: 35.55 KG/M2 | HEIGHT: 69 IN | DIASTOLIC BLOOD PRESSURE: 72 MMHG | WEIGHT: 240 LBS

## 2020-04-15 DIAGNOSIS — R52 PAIN: Primary | ICD-10-CM

## 2020-04-15 DIAGNOSIS — M70.62 GREATER TROCHANTERIC BURSITIS, LEFT: ICD-10-CM

## 2020-04-15 DIAGNOSIS — S46.811A TRAPEZIUS MUSCLE STRAIN, RIGHT, INITIAL ENCOUNTER: ICD-10-CM

## 2020-04-15 PROCEDURE — 20610 DRAIN/INJ JOINT/BURSA W/O US: CPT | Performed by: NURSE PRACTITIONER

## 2020-04-15 PROCEDURE — 99203 OFFICE O/P NEW LOW 30 MIN: CPT | Performed by: NURSE PRACTITIONER

## 2020-04-15 PROCEDURE — 73030 X-RAY EXAM OF SHOULDER: CPT | Performed by: NURSE PRACTITIONER

## 2020-04-15 RX ORDER — CYCLOBENZAPRINE HCL 10 MG
10 TABLET ORAL 3 TIMES DAILY PRN
Qty: 30 TABLET | Refills: 0 | Status: SHIPPED | OUTPATIENT
Start: 2020-04-15 | End: 2020-05-21 | Stop reason: SDUPTHER

## 2020-04-15 RX ADMIN — TRIAMCINOLONE ACETONIDE 80 MG: 40 INJECTION, SUSPENSION INTRA-ARTICULAR; INTRAMUSCULAR at 09:45

## 2020-04-15 RX ADMIN — LIDOCAINE HYDROCHLORIDE 4 ML: 10 INJECTION, SOLUTION EPIDURAL; INFILTRATION; INTRACAUDAL; PERINEURAL at 09:45

## 2020-04-15 NOTE — PROGRESS NOTES
Subjective:     Patient ID: Jaye Rivas is a 59 y.o. female.    Chief Complaint:    History of Present Illness  Jaye Rivas 49-year-old female presents to clinic for evaluation of right shoulder pain and left hip pain.  She has been seen by her primary care provider has been referred to our office by another patient.  Pain has been present since November 2019.  She suddenly became homeless was living in her vehicle believes the pain may have been due to the way that she was sleeping in her car.  Maximal tenderness present the trapeze muscle radiating into neck pinpoint tenderness noted.  She is tried ibuprofen and other anti-inflammatories without any significant symptom relief.  She describes pain as a burning and tightness sensation which is not radiating inferiorly rates discomfort at a 9-10 out of a 10.  Increased pain noted with rotation of the neck, bending of the neck and present when she attempts to sleep at night.  She denies any presence of numbness or tingling radiating down the right upper extremity.  Denies previous injury to the neck in the past.  She has had back surgery approximately 9 to 10 years ago outside facility.  She does have headaches but does not associate that with the neck pain.  She is experiencing milder pain at the left side but not to the severity of the of the right.  She is also like to be seen for pain she is experiencing at the lateral aspect of the hip which was not verbalized initially when patient was roomed.  She is been experiencing pain at the lateral aspect of the hip for the last several months greater left than right.  She is able to pinpoint tenderness to the lateral aspect of the hip which does radiate inferiorly down the lateral aspect of the leg just inferior to the knee.  Again denies any presence of numbness or tingling radiating down the left lower extremity.  She is not experiencing pain radiating into her groin.  Denies any previous x-ray, MRI, CT of hips denies  any previous corticosteroid injections or any other treatments for the hip.  Denies that the left lower extremity is giving out on her when she is ambulating.  Denies other concerns present this time.     Social History     Occupational History   • Not on file   Tobacco Use   • Smoking status: Never Smoker   • Smokeless tobacco: Never Used   Substance and Sexual Activity   • Alcohol use: No     Frequency: Never   • Drug use: No   • Sexual activity: Defer      Past Medical History:   Diagnosis Date   • Depression    • History of sleep apnea    • Migraine      Past Surgical History:   Procedure Laterality Date   • BACK SURGERY     • DILATATION AND CURETTAGE     • EYE SURGERY         Family History   Problem Relation Age of Onset   • No Known Problems Mother    • Heart failure Father    • Breast cancer Maternal Aunt    • Breast cancer Maternal Grandmother          Review of Systems   Constitutional: Negative for chills, diaphoresis, fever and unexpected weight change.   HENT: Negative for hearing loss, nosebleeds, sore throat and tinnitus.    Eyes: Negative for pain and visual disturbance.   Respiratory: Negative for cough, shortness of breath and wheezing.    Cardiovascular: Negative for chest pain and palpitations.   Gastrointestinal: Negative for abdominal pain, diarrhea, nausea and vomiting.   Endocrine: Negative for cold intolerance, heat intolerance and polydipsia.   Genitourinary: Negative for difficulty urinating, dysuria and hematuria.   Musculoskeletal: Positive for arthralgias. Negative for joint swelling and myalgias.   Skin: Negative for rash and wound.   Allergic/Immunologic: Negative for environmental allergies.   Neurological: Negative for dizziness, syncope and numbness.   Hematological: Does not bruise/bleed easily.   Psychiatric/Behavioral: Negative for dysphoric mood and sleep disturbance. The patient is not nervous/anxious.    All other systems reviewed and are negative.       "    Objective:  Physical Exam    Vital signs reviewed.   General: No acute distress.  Eyes: conjunctiva clear; pupils equally round and reactive  ENT: external ears and nose atraumatic; oropharynx clear  CV: no peripheral edema  Resp: normal respiratory effort  Skin: no rashes or wounds; normal turgor  Psych: mood and affect appropriate; recent and remote memory intact    Vitals:    04/15/20 0900   BP: 110/72   Pulse: 54   Weight: 109 kg (240 lb)   Height: 175.3 cm (69\")         04/15/20  0900   Weight: 109 kg (240 lb)     Body mass index is 35.44 kg/m².      Right Hip Exam     Comments:  Negative logroll exam  negative stinchfield exam        Left Hip Exam     Tenderness   The patient is experiencing tenderness in the greater trochanter.    Range of Motion   Abduction: 45   Adduction: 30   Extension: 0   Flexion: 90   External rotation: 50   Internal rotation: 25     Muscle Strength   Abduction: 4/5   Adduction: 4/5   Flexion: 4/5     Tests   VIKTOR: negative  Anthony: positive  Fadir:  Negative FADIR test    Other   Erythema: absent  Sensation: normal  Pulse: present    Comments:  Negative logroll exam  negative stinchfield exam        Right Shoulder Exam     Range of Motion   External rotation: 60   Forward flexion: 180   Internal rotation 0 degrees: T10     Tests   Cummins test: negative  Cross arm: negative  Impingement: positive  Drop arm: negative  Sulcus: absent    Other   Erythema: absent  Sensation: normal  Pulse: present    Comments:  Negative empty can  negative [place]'s  negative Speed's  negative bear hug exam  Maximal tenderness trapezius muscle right side, negative Spurling's exam           Imaging:  Right Shoulder X-Ray  Indication: Pain  AP Internal and External Rotation views    Findings:  No fracture  No bony lesion  Normal soft tissues  AC joint narrowing    No prior studies were available for comparison.    Assessment:        1. Pain    2. Trapezius muscle strain, right, initial encounter    3. " Greater trochanteric bursitis, left           Plan:  1.  Discussed plan of care with patient.  Wish to proceed with corticosteroid injection greater trochanteric bursa, left hip.  2.  Provided her with home strengthening exercises for bilateral lower extremities including hips, quads hamstrings and calf.  3.  Provided her with strengthening and stretching exercises cervical thoracic and lumbar spine of her to complete at home. Discussed with patient and demonstrated massage therapy technique that she can complete on her own. Will start diclofenac 1 tablet twice daily for the next 4 weeks along with cyclobenzaprine.  We will plan to see her back in clinic in 6 weeks to reevaluate.  Long discussion with patient regarding avoiding concurrent use of any other NSAIDs including but not limited to ibuprofen, Advil, Aleve, naproxen.  Follow-up will complete x-ray imaging left hip and right hip if it is tender.  She verbalized understanding of all information agrees with plan of care.  Denies other concerns present this time.  Large Joint Arthrocentesis: L hip joint  Date/Time: 4/15/2020 9:45 AM  Consent given by: patient  Site marked: site marked  Timeout: Immediately prior to procedure a time out was called to verify the correct patient, procedure, equipment, support staff and site/side marked as required   Supporting Documentation  Indications: pain   Procedure Details  Location: hip - L hip joint  Preparation: Patient was prepped and draped in the usual sterile fashion  Needle size: 22 G  Approach: lateral  Medications administered: 4 mL lidocaine PF 1% 1 %; 80 mg triamcinolone acetonide 40 MG/ML  Patient tolerance: patient tolerated the procedure well with no immediate complications            Orders:  Orders Placed This Encounter   Procedures   • Large Joint Arthrocentesis: L hip joint   • XR Shoulder 2+ View Right       Medications:  New Medications Ordered This Visit   Medications   • diclofenac (VOLTAREN) 50 MG EC  tablet     Sig: Take 1 tablet by mouth 2 (Two) Times a Day.     Dispense:  60 tablet     Refill:  0   • cyclobenzaprine (FLEXERIL) 10 MG tablet     Sig: Take 1 tablet by mouth 3 (Three) Times a Day As Needed for Muscle Spasms.     Dispense:  30 tablet     Refill:  0       Followup:  No follow-ups on file.    Jaye was seen today for pain.    Diagnoses and all orders for this visit:    Pain  -     XR Shoulder 2+ View Right  -     Large Joint Arthrocentesis: L hip joint    Trapezius muscle strain, right, initial encounter    Greater trochanteric bursitis, left    Other orders  -     diclofenac (VOLTAREN) 50 MG EC tablet; Take 1 tablet by mouth 2 (Two) Times a Day.  -     cyclobenzaprine (FLEXERIL) 10 MG tablet; Take 1 tablet by mouth 3 (Three) Times a Day As Needed for Muscle Spasms.          I ordered and reviewed the EDYTA today.     Dictated utilizing Dragon dictation

## 2020-04-16 RX ORDER — LIDOCAINE HYDROCHLORIDE 10 MG/ML
4 INJECTION, SOLUTION EPIDURAL; INFILTRATION; INTRACAUDAL; PERINEURAL
Status: COMPLETED | OUTPATIENT
Start: 2020-04-15 | End: 2020-04-15

## 2020-04-16 RX ORDER — TRIAMCINOLONE ACETONIDE 40 MG/ML
80 INJECTION, SUSPENSION INTRA-ARTICULAR; INTRAMUSCULAR
Status: COMPLETED | OUTPATIENT
Start: 2020-04-15 | End: 2020-04-15

## 2020-05-01 ENCOUNTER — TELEMEDICINE (OUTPATIENT)
Dept: FAMILY MEDICINE CLINIC | Facility: CLINIC | Age: 60
End: 2020-05-01

## 2020-05-01 DIAGNOSIS — F33.1 MODERATE EPISODE OF RECURRENT MAJOR DEPRESSIVE DISORDER (HCC): Primary | ICD-10-CM

## 2020-05-01 PROCEDURE — 99214 OFFICE O/P EST MOD 30 MIN: CPT | Performed by: FAMILY MEDICINE

## 2020-05-01 NOTE — PATIENT INSTRUCTIONS
Fluoxetine capsules or tablets (PMDD indication)  What is this medicine?  FLUOXETINE (floo OX e teen) belongs to a class of drugs known as selective serotonin reuptake inhibitors (SSRIs). It is used for premenstrual dysphoric disorder (PMDD). PMDD causes intense mood and physical symptoms a week or two before your period every month. This drug helps improve mood swings, tiredness, tension, and breast tenderness.  This medicine may be used for other purposes; ask your health care provider or pharmacist if you have questions.  COMMON BRAND NAME(S): Prozac, Sarafem, Selfemra  What should I tell my health care provider before I take this medicine?  They need to know if you have any of these conditions:  -bipolar disorder or a family history of bipolar disorder  -bleeding disorders  -glaucoma  -heart disease  -liver disease  -low levels of sodium in the blood  -seizures  -suicidal thoughts, plans, or attempt; a previous suicide attempt by you or a family member  -take MAOIs like Carbex, Eldepryl, Marplan, Nardil, and Parnate  -take medicines that treat or prevent blood clots  -thyroid disease  -an unusual or allergic reaction to fluoxetine, other medicines, foods, dyes, or preservatives  -pregnant or trying to get pregnant  -breast-feeding  -bipolar disorder or a family history of bipolar disorder  -bleeding disorders  -glaucoma  -heart disease  -liver disease  -low levels of sodium in the blood  -seizures  -suicidal thoughts, plans, or attempt; a previous suicide attempt by you or a family member  -take MAOIs like Carbex, Eldepryl, Marplan, Nardil, and Parnate  -take medicines that treat or prevent blood clots  -thyroid disease  -an unusual or allergic reaction to fluoxetine, other medicines, foods, dyes, or preservatives  -pregnant or trying to get pregnant  -breast-feeding  How should I use this medicine?  Take this medicine by mouth with a glass of water. Follow the directions on the prescription label. You can take  it with or without food. Take your medicine at regular intervals. Do not take it more often than directed. Do not stop taking this medicine suddenly except upon the advice of your doctor. Stopping this medicine too quickly may cause serious side effects or your condition may worsen.  A special MedGuide will be given to you by the pharmacist with each prescription and refill. Be sure to read this information carefully each time.  Talk to your pediatrician regarding the use of this medicine in children. Special care may be needed.  Overdosage: If you think you have taken too much of this medicine contact a poison control center or emergency room at once.  NOTE: This medicine is only for you. Do not share this medicine with others.  What if I miss a dose?  If you miss a dose, skip the missed dose and go back to your regular dosing schedule. Do not take double or extra doses.  What may interact with this medicine?  Do not take this medicine with any of the following medications:  -other medicines containing fluoxetine, like Prozac or Symbyax  -cisapride  -dronedarone  -linezolid  -MAOIs like Carbex, Eldepryl, Marplan, Nardil, and Parnate  -methylene blue (injected into a vein)  -pimozide  -thioridazine  This medicine may also interact with the following medications:  -alcohol  -amphetamines  -aspirin and aspirin-like medicines  -carbamazepine  -certain medicines for depression, anxiety, or psychotic disturbances  -certain medicines for migraine headaches like almotriptan, eletriptan, frovatriptan, naratriptan, rizatriptan, sumatriptan, zolmitriptan  -digoxin  -diuretics  -fentanyl  -flecainide  -furazolidone  -isoniazid  -lithium  -medicines for sleep  -medicines that treat or prevent blood clots like warfarin, enoxaparin, and dalteparin  -NSAIDs, medicines for pain and inflammation, like ibuprofen or naproxen  -other medicines that prolong the QT interval (an abnormal heart  rhythm)  -phenytoin  -procarbazine  -propafenone  -rasagiline  -ritonavir  -supplements like [place]'s wort, kava kava, valerian  -tramadol  -tryptophan  -vinblastine  This list may not describe all possible interactions. Give your health care provider a list of all the medicines, herbs, non-prescription drugs, or dietary supplements you use. Also tell them if you smoke, drink alcohol, or use illegal drugs. Some items may interact with your medicine.  What should I watch for while using this medicine?  Tell your doctor if your symptoms do not get better or if they get worse. Visit your doctor or health care professional for regular checks on your progress.  Patients and their families should watch out for new or worsening thoughts of suicide or depression. Also watch out for sudden changes in feelings such as feeling anxious, agitated, panicky, irritable, hostile, aggressive, impulsive, severely restless, overly excited and hyperactive, or not being able to sleep. If this happens, especially at the beginning of treatment or after a change in dose, call your health care professional.  You may get drowsy or dizzy. Do not drive, use machinery, or do anything that needs mental alertness until you know how this medicine affects you. Do not stand or sit up quickly, especially if you are an older patient. This reduces the risk of dizzy or fainting spells. Alcohol may interfere with the effect of this medicine. Avoid alcoholic drinks.  Your mouth may get dry. Chewing sugarless gum or sucking hard candy, and drinking plenty of water may help. Contact your doctor if the problem does not go away or is severe.  This medicine may affect blood sugar levels. If you have diabetes, check with your doctor or health care professional before you change your diet or the dose of your diabetic medicine.  What side effects may I notice from receiving this medicine?  Side effects that you should report to your doctor or health care  professional as soon as possible:  -allergic reactions like skin rash, itching or hives, swelling of the face, lips, or tongue  -anxious  -black, tarry stools  -breathing problems  -changes in vision  -confusion  -elevated mood, decreased need for sleep, racing thoughts, impulsive behavior  -eye pain  -fast, irregular heartbeat  -feeling faint or lightheaded, falls  -feeling agitated, angry, or irritable  -hallucination, loss of contact with reality  -loss of balance or coordination  -loss of memory  -restlessness, pacing, inability to keep still  -seizures  -stiff muscles  -suicidal thoughts or other mood changes  -trouble sleeping  -unusual bleeding or bruising  -unusually weak or tired  -vomiting  Side effects that usually do not require medical attention (report to your doctor or health care professional if they continue or are bothersome):  -change in appetite or weight  -change in sex drive or performance  -diarrhea  -dry mouth  -headache  -increased sweating  -indigestion, nausea  -tremors  This list may not describe all possible side effects. Call your doctor for medical advice about side effects. You may report side effects to FDA at 9-581-FDA-2065.  Where should I keep my medicine?  Keep out of the reach of children.  Store at room temperature between 15 and 30 degrees C (59 and 86 degrees F). Throw away any unused medicine after the expiration date.  NOTE: This sheet is a summary. It may not cover all possible information. If you have questions about this medicine, talk to your doctor, pharmacist, or health care provider.  © 2020 Elsevier/Gold Standard (2019-08-08 12:45:43)  Major Depressive Disorder, Adult  Major depressive disorder (MDD) is a mental health condition. MDD often makes you feel sad, hopeless, or helpless. MDD can also cause symptoms in your body. MDD can affect your:  · Work.  · School.  · Relationships.  · Other normal activities.  MDD can range from mild to very bad. It may occur once  (single episode MDD). It can also occur many times (recurrent MDD).  The main symptoms of MDD often include:  · Feeling sad, depressed, or irritable most of the time.  · Loss of interest.  MDD symptoms also include:  · Sleeping too much or too little.  · Eating too much or too little.  · A change in your weight.  · Feeling tired (fatigue) or having low energy.  · Feeling worthless.  · Feeling guilty.  · Trouble making decisions.  · Trouble thinking clearly.  · Thoughts of suicide or harming others.  · Feeling weak.  · Feeling agitated.  · Keeping yourself from being around other people (isolation).  Follow these instructions at home:  Activity  · Do these things as told by your doctor:  ? Go back to your normal activities.  ? Exercise regularly.  ? Spend time outdoors.  Alcohol  · Talk with your doctor about how alcohol can affect your antidepressant medicines.  · Do not drink alcohol. Or, limit how much alcohol you drink.  ? This means no more than 1 drink a day for nonpregnant women and 2 drinks a day for men. One drink equals one of these:  § 12 oz of beer.  § 5 oz of wine.  § 1½ oz of hard liquor.  General instructions  · Take over-the-counter and prescription medicines only as told by your doctor.  · Eat a healthy diet.  · Get plenty of sleep.  · Find activities that you enjoy. Make time to do them.  · Think about joining a support group. Your doctor may be able to suggest a group for you.  · Keep all follow-up visits as told by your doctor. This is important.  Where to find more information:  · National [place] on Mental Illness:  ? www.ashley.org  · [place] National [place] of Mental Health:  ? www.nimh.nih.gov  · National Suicide Prevention Lifeline:  ? 1-970.293.5402. This is free, 24-hour help.  Contact a doctor if:  · Your symptoms get worse.  · You have new symptoms.  Get help right away if:  · You self-harm.  · You see, hear, taste, smell, or feel things that are not present (hallucinate).  If you ever  feel like you may hurt yourself or others, or have thoughts about taking your own life, get help right away. You can go to your nearest emergency department or call:  · Your local emergency services (911 in the [place]  · A suicide crisis helpline, such as the National Suicide Prevention Lifeline:  ? 7-641-034-2054. This is open 24 hours a day.  This information is not intended to replace advice given to you by your health care provider. Make sure you discuss any questions you have with your health care provider.  Document Released: 11/28/2016 Document Revised: 09/03/2017 Document Reviewed: 09/03/2017  Elsevier Interactive Patient Education © 2020 Elsevier Inc.

## 2020-05-01 NOTE — PROGRESS NOTES
Subjective   Jaye Rivas is a 59 y.o. female is here for   Chief Complaint   Patient presents with   • Depression       History of Present Illness     You have chosen to receive care through a telehealth visit.  Do you consent to use a video/audio connection for your medical care today? Yes      Patient has depression that was significantly affecting her activities of daily living.  We started her on Prozac and she is currently taking 20 mg daily.  She says that she feels much better and her activities of daily living have improved with her Prozac.  No medication side effects.  She states she does better with the medicine worse without it.    The following portions of the patient's history were reviewed and updated as appropriate: allergies, current medications, past family history, past medical history, past social history, past surgical history and problem list.     reports that she has never smoked. She has never used smokeless tobacco. She reports that she does not drink alcohol or use drugs.    Review of Systems   Constitutional: Negative for activity change and unexpected weight change.   Respiratory: Negative for shortness of breath and wheezing.    Cardiovascular: Negative for chest pain and palpitations.   Gastrointestinal: Negative for abdominal pain, blood in stool and constipation.   Genitourinary: Negative for difficulty urinating and hematuria.   Musculoskeletal: Negative for gait problem.   Skin: Negative for color change and rash.        PHQ-9 Depression Screening  Little interest or pleasure in doing things?     Feeling down, depressed, or hopeless?     Trouble falling or staying asleep, or sleeping too much?     Feeling tired or having little energy?     Poor appetite or overeating?     Feeling bad about yourself - or that you are a failure or have let yourself or your family down?     Trouble concentrating on things, such as reading the newspaper or watching television?     Moving or speaking so  slowly that other people could have noticed? Or the opposite - being so fidgety or restless that you have been moving around a lot more than usual?     Thoughts that you would be better off dead, or of hurting yourself in some way?     PHQ-9 Total Score     If you checked off any problems, how difficult have these problems made it for you to do your work, take care of things at home, or get along with other people?           Objective   There were no vitals taken for this visit.  Physical Exam   Constitutional: She appears well-developed and well-nourished. No distress.   HENT:   Head: Normocephalic and atraumatic.   Pulmonary/Chest: Effort normal.   Neurological: She is alert.   Skin: She is not diaphoretic.   Psychiatric: She has a normal mood and affect. Her behavior is normal. Judgment and thought content normal.       Procedures    Assessment/Plan   Diagnoses and all orders for this visit:    1. Moderate episode of recurrent major depressive disorder (CMS/HCC) (Primary)  Assessment & Plan:  Controlled with Prozac 20 mg daily. No medication side effects.  Continue current treatment.                I spent over 25 minutes with the patient, of which more than 50% was counseling, including lifestyle choices, anxiety, and depression.

## 2020-05-20 ENCOUNTER — TELEPHONE (OUTPATIENT)
Dept: FAMILY MEDICINE CLINIC | Facility: CLINIC | Age: 60
End: 2020-05-20

## 2020-05-20 NOTE — TELEPHONE ENCOUNTER
BREANNA FROM [facility] CALLED AND STATED THE PATIENT HAS REQUESTED FOR HER MEDICATIONS TO BE MAILED TO HER. BREANNA REQUESTED A FRESH LIST OF PRESCRIPTIONS BE SENT OT THE PHARMACY SO THEY CAN HAVE AN UP TO DATE LIST OF EVERYTHING THE PATIENT NEEDS. PLEASE ADVISE.     PHARMACY  CALL BACK [phone]  [facility] CALL BACK [phone]  FAX NUMBER [phone]

## 2020-05-21 DIAGNOSIS — R60.0 LOWER EXTREMITY EDEMA: ICD-10-CM

## 2020-05-21 DIAGNOSIS — G89.29 CHRONIC BILATERAL LOW BACK PAIN WITH BILATERAL SCIATICA: ICD-10-CM

## 2020-05-21 DIAGNOSIS — E78.2 MIXED HYPERLIPIDEMIA: ICD-10-CM

## 2020-05-21 DIAGNOSIS — S46.811A TRAPEZIUS MUSCLE STRAIN, RIGHT, INITIAL ENCOUNTER: ICD-10-CM

## 2020-05-21 DIAGNOSIS — M54.41 CHRONIC BILATERAL LOW BACK PAIN WITH BILATERAL SCIATICA: ICD-10-CM

## 2020-05-21 DIAGNOSIS — F41.8 DEPRESSION WITH ANXIETY: Primary | ICD-10-CM

## 2020-05-21 DIAGNOSIS — F33.1 MODERATE EPISODE OF RECURRENT MAJOR DEPRESSIVE DISORDER (HCC): ICD-10-CM

## 2020-05-21 DIAGNOSIS — M54.42 CHRONIC BILATERAL LOW BACK PAIN WITH BILATERAL SCIATICA: ICD-10-CM

## 2020-05-21 RX ORDER — POTASSIUM CHLORIDE 750 MG/1
10 TABLET, FILM COATED, EXTENDED RELEASE ORAL DAILY
Qty: 90 TABLET | Refills: 1 | Status: SHIPPED | OUTPATIENT
Start: 2020-05-21 | End: 2020-10-27

## 2020-05-21 RX ORDER — FUROSEMIDE 20 MG/1
20 TABLET ORAL DAILY
Qty: 90 TABLET | Refills: 1 | Status: SHIPPED | OUTPATIENT
Start: 2020-05-21 | End: 2020-10-27

## 2020-05-21 RX ORDER — ATORVASTATIN CALCIUM 10 MG/1
10 TABLET, FILM COATED ORAL DAILY
Qty: 90 TABLET | Refills: 1 | Status: SHIPPED | OUTPATIENT
Start: 2020-05-21 | End: 2020-12-04

## 2020-05-21 RX ORDER — CYCLOBENZAPRINE HCL 10 MG
10 TABLET ORAL 3 TIMES DAILY PRN
Qty: 30 TABLET | Refills: 0 | Status: SHIPPED | OUTPATIENT
Start: 2020-05-21 | End: 2020-06-29 | Stop reason: SDUPTHER

## 2020-05-21 RX ORDER — FLUOXETINE HYDROCHLORIDE 20 MG/1
20 CAPSULE ORAL DAILY
Qty: 90 CAPSULE | Refills: 1 | Status: SHIPPED | OUTPATIENT
Start: 2020-05-21 | End: 2020-10-27

## 2020-05-21 NOTE — TELEPHONE ENCOUNTER
I spoke with patient and advised [facility]  She then requested something for a migrain.  She Scheduled a video visit to talk with Dr. Hermosillo about her migrain for tomorrow.  C> Siria CMA

## 2020-05-21 NOTE — TELEPHONE ENCOUNTER
Please call the patient and let her know that all of her prescriptions were sent to the [facility] pharmacy per her/their request. Thank you.

## 2020-05-22 ENCOUNTER — TELEMEDICINE (OUTPATIENT)
Dept: FAMILY MEDICINE CLINIC | Facility: CLINIC | Age: 60
End: 2020-05-22

## 2020-05-22 DIAGNOSIS — G44.89 OTHER HEADACHE SYNDROME: Primary | ICD-10-CM

## 2020-05-22 PROCEDURE — 99214 OFFICE O/P EST MOD 30 MIN: CPT | Performed by: FAMILY MEDICINE

## 2020-05-22 RX ORDER — MELOXICAM 7.5 MG/1
7.5 TABLET ORAL DAILY
Qty: 30 TABLET | Refills: 0 | Status: SHIPPED | OUTPATIENT
Start: 2020-05-22 | End: 2020-06-29 | Stop reason: SDUPTHER

## 2020-05-22 NOTE — PROGRESS NOTES
Subjective   Jaye Rivas is a 59 y.o. female is here for   Chief Complaint   Patient presents with   • Headache       You have chosen to receive care through a telehealth visit.  Do you consent to use a video/audio connection for your medical care today? Yes    History of Present Illness     Pt states she has had a migraine for the past two weeks and It is not getting better.  She has had migraine headaches in the past but they have not lasted this long.  She states she has been stressed out because people have been bothering her.  She has failed Tylenol and Aleve.  Her headache feels like it is pounding. No aura. No vomiting.    She has a home BP machine that goes on the wrist but has not been checking her BP measurements.  She checked it today during our visit which was 113/72.    She has HLD and takes Atorvastatin.    She has depression that is controlled with Fluoxetine and improves her ADL's significantly. She functions better with the medicine and worse without it.    The following portions of the patient's history were reviewed and updated as appropriate: allergies, current medications, past family history, past medical history, past social history, past surgical history and problem list.     reports that she has never smoked. She has never used smokeless tobacco. She reports that she does not drink alcohol or use drugs.    Review of Systems   Constitutional: Negative for activity change and unexpected weight change.   Respiratory: Negative for shortness of breath and wheezing.    Cardiovascular: Negative for chest pain and palpitations.   Gastrointestinal: Negative for abdominal pain, blood in stool and constipation.   Genitourinary: Negative for difficulty urinating and hematuria.   Musculoskeletal: Negative for gait problem.   Skin: Negative for color change and rash.        PHQ-9 Depression Screening  Little interest or pleasure in doing things?     Feeling down, depressed, or hopeless?     Trouble falling  or staying asleep, or sleeping too much?     Feeling tired or having little energy?     Poor appetite or overeating?     Feeling bad about yourself - or that you are a failure or have let yourself or your family down?     Trouble concentrating on things, such as reading the newspaper or watching television?     Moving or speaking so slowly that other people could have noticed? Or the opposite - being so fidgety or restless that you have been moving around a lot more than usual?     Thoughts that you would be better off dead, or of hurting yourself in some way?     PHQ-9 Total Score     If you checked off any problems, how difficult have these problems made it for you to do your work, take care of things at home, or get along with other people?           Objective   There were no vitals taken for this visit.  Physical Exam   Constitutional: She appears well-developed and well-nourished. No distress.   HENT:   Head: Normocephalic and atraumatic.   Pulmonary/Chest: Effort normal. No stridor.   Neurological: She is alert.   Skin: No rash noted. She is not diaphoretic.   Psychiatric: She has a normal mood and affect. She mood appears normal.   Vitals reviewed.       Procedures    Assessment/Plan   Diagnoses and all orders for this visit:    1. Other headache syndrome (Primary)  Assessment & Plan:  Start Meloxicam 7.5 Daily.           Orders:  -     meloxicam (MOBIC) 7.5 MG tablet; Take 1 tablet by mouth Daily.  Dispense: 30 tablet; Refill: 0    I spent over 25 minutes with the patient, of which more than 50% was counseling, including headaches and medications. The patient was also counseled on diet and exercise to minimize or eliminate concentrated sweets and sweetened beverages, as well as cutting back on breads and pastas.

## 2020-05-22 NOTE — PATIENT INSTRUCTIONS
Form - Headache Record  There are many types and causes of headaches. A headache record can help guide your treatment plan. Use this form to record the details. Bring this form with you to your follow-up visits.  Follow your health care provider's instructions on how to describe your headache. You may be asked to:  · Use a pain scale. This is a tool to rate the intensity of your headache using words or numbers.  · Describe what your headache feels like, such as dull, achy, throbbing, or sharp.  Headache record  Date: _______________ Time (from start to end): ____________________ Location of the headache: _________________________  · Intensity of the headache: ____________________ Description of the headache: ______________________________________________________________  · Hours of sleep the night before the headache: __________  · Food or drinks before the headache started: ______________________________________________________________________________________  · Events before the headache started: _______________________________________________________________________________________________  · Symptoms before the headache started: __________________________________________________________________________________________  · Symptoms during the headache: __________________________________________________________________________________________________  · Treatment: ________________________________________________________________________________________________________________  · Effect of treatment: _________________________________________________________________________________________________________  · Other comments: ___________________________________________________________________________________________________________  Date: _______________ Time (from start to end): ____________________ Location of the headache: _________________________  · Intensity of the headache: ____________________ Description of the  headache: ______________________________________________________________  · Hours of sleep the night before the headache: __________  · Food or drinks before the headache started: ______________________________________________________________________________________  · Events before the headache started: ____________________________________________________________________________________________  · Symptoms before the headache started: _________________________________________________________________________________________  · Symptoms during the headache: _______________________________________________________________________________________________  · Treatment: ________________________________________________________________________________________________________________  · Effect of treatment: _________________________________________________________________________________________________________  · Other comments: ___________________________________________________________________________________________________________  Date: _______________ Time (from start to end): ____________________ Location of the headache: _________________________  · Intensity of the headache: ____________________ Description of the headache: ______________________________________________________________  · Hours of sleep the night before the headache: __________  · Food or drinks before the headache started: ______________________________________________________________________________________  · Events before the headache started: ____________________________________________________________________________________________  · Symptoms before the headache started: _________________________________________________________________________________________  · Symptoms during the headache: _______________________________________________________________________________________________  · Treatment:  ________________________________________________________________________________________________________________  · Effect of treatment: _________________________________________________________________________________________________________  · Other comments: ___________________________________________________________________________________________________________  Date: _______________ Time (from start to end): ____________________ Location of the headache: _________________________  · Intensity of the headache: ____________________ Description of the headache: ______________________________________________________________  · Hours of sleep the night before the headache: _________  · Food or drinks before the headache started: ______________________________________________________________________________________  · Events before the headache started: ____________________________________________________________________________________________  · Symptoms before the headache started: _________________________________________________________________________________________  · Symptoms during the headache: _______________________________________________________________________________________________  · Treatment: ________________________________________________________________________________________________________________  · Effect of treatment: _________________________________________________________________________________________________________  · Other comments: ___________________________________________________________________________________________________________  Date: _______________ Time (from start to end): ____________________ Location of the headache: _________________________  · Intensity of the headache: ____________________ Description of the headache: ______________________________________________________________  · Hours of sleep the night before the headache: _________  · Food or drinks  before the headache started: ______________________________________________________________________________________  · Events before the headache started: ____________________________________________________________________________________________  · Symptoms before the headache started: _________________________________________________________________________________________  · Symptoms during the headache: _______________________________________________________________________________________________  · Treatment: ________________________________________________________________________________________________________________  · Effect of treatment: _________________________________________________________________________________________________________  · Other comments: ___________________________________________________________________________________________________________  This information is not intended to replace advice given to you by your health care provider. Make sure you discuss any questions you have with your health care provider.  Document Released: 01/06/2020 Document Revised: 01/06/2020 Document Reviewed: 01/06/2020  [employer]vier Patient Education © 2020 [employer]

## 2020-05-27 ENCOUNTER — OFFICE VISIT (OUTPATIENT)
Dept: ORTHOPEDIC SURGERY | Facility: CLINIC | Age: 60
End: 2020-05-27

## 2020-05-27 VITALS — HEIGHT: 69 IN | WEIGHT: 240 LBS | BODY MASS INDEX: 35.55 KG/M2

## 2020-05-27 DIAGNOSIS — S46.811A TRAPEZIUS MUSCLE STRAIN, RIGHT, INITIAL ENCOUNTER: ICD-10-CM

## 2020-05-27 DIAGNOSIS — R52 PAIN: Primary | ICD-10-CM

## 2020-05-27 DIAGNOSIS — M54.12 RADICULOPATHY OF CERVICAL SPINE: ICD-10-CM

## 2020-05-27 DIAGNOSIS — M70.62 GREATER TROCHANTERIC BURSITIS, LEFT: ICD-10-CM

## 2020-05-27 DIAGNOSIS — M54.41 CHRONIC BILATERAL LOW BACK PAIN WITH BILATERAL SCIATICA: ICD-10-CM

## 2020-05-27 DIAGNOSIS — G89.29 CHRONIC BILATERAL LOW BACK PAIN WITH BILATERAL SCIATICA: ICD-10-CM

## 2020-05-27 DIAGNOSIS — M54.42 CHRONIC BILATERAL LOW BACK PAIN WITH BILATERAL SCIATICA: ICD-10-CM

## 2020-05-27 PROCEDURE — 99214 OFFICE O/P EST MOD 30 MIN: CPT | Performed by: NURSE PRACTITIONER

## 2020-05-27 PROCEDURE — 72040 X-RAY EXAM NECK SPINE 2-3 VW: CPT | Performed by: NURSE PRACTITIONER

## 2020-05-27 RX ORDER — CYCLOBENZAPRINE HCL 10 MG
TABLET ORAL
Qty: 30 TABLET | Refills: 10 | OUTPATIENT
Start: 2020-05-27

## 2020-05-27 NOTE — PROGRESS NOTES
Subjective:     Patient ID: Jaye Rivas is a 59 y.o. female.    Chief Complaint:  Follow-up cervical pain  Follow-up greater trochanteric bursitis, left   History of Present Illness  Jaye Rivas returns to clinic for follow-up cervical neck pain and pain at the left hip.  Pain at the left hip completely subsided with the previous corticosteroid injection does not wish to be seen for hip pain today.  She does wish to be seen for the numbness and tingling as well as the pain she is getting radiating down bilateral upper extremities.  Pain present at the neck which was present prior exam.  Did not receive any significant symptom relief.  Again she is noticing numbness and tingling radiating down bilateral upper extremities worse when seated with arms extended to side and with any kind of motion.  Numbness and tingling is radiating into all digits bilateral hands and she is experiencing associated weakness.  Denies any previous x-ray, MRI, CT of cervical spine.  Has had MRI in the past of thoracic spine does report previous degenerative disc issues.  She is extremely pleased with the symptom relief she is received with the hip injection again is able to ambulate without any significant discomfort.  Primary care is recently discontinued diclofenac currently taking meloxicam 7.5 mg 1 tablet once daily.  She is continue taking the cyclobenzaprine without any significant symptom relief.  She is tried physical therapy in the past was referred by her primary care however was denied.  Continues to rate discomfort to 9-10 out of a 10 describes as burning sensation tight in nature.  She has tried and failed ibuprofen and other anti-inflammatories without significant symptom relief.  Denies all concerns she has at this time.     Social History     Occupational History   • Not on file   Tobacco Use   • Smoking status: Never Smoker   • Smokeless tobacco: Never Used   Substance and Sexual Activity   • Alcohol use: No     Frequency:  "Never   • Drug use: No   • Sexual activity: Defer      Past Medical History:   Diagnosis Date   • Depression    • History of sleep apnea    • Migraine      Past Surgical History:   Procedure Laterality Date   • BACK SURGERY     • DILATATION AND CURETTAGE     • EYE SURGERY         Family History   Problem Relation Age of Onset   • No Known Problems Mother    • Heart failure Father    • Breast cancer Maternal Aunt    • Breast cancer Maternal Grandmother          Review of Systems   Constitutional: Negative for appetite change, chills, diaphoresis, fever and unexpected weight change.   HENT: Negative for hearing loss, nosebleeds, sore throat and tinnitus.    Eyes: Negative for pain and visual disturbance.   Respiratory: Negative for cough, shortness of breath and wheezing.    Cardiovascular: Negative for chest pain and palpitations.   Gastrointestinal: Negative for abdominal pain, diarrhea, nausea and vomiting.   Endocrine: Negative for cold intolerance, heat intolerance and polydipsia.   Genitourinary: Negative for difficulty urinating, dysuria and hematuria.   Musculoskeletal: Positive for arthralgias and myalgias. Negative for joint swelling.   Skin: Negative for rash and wound.   Allergic/Immunologic: Negative for environmental allergies.   Neurological: Negative for dizziness, syncope and numbness.   Hematological: Does not bruise/bleed easily.   Psychiatric/Behavioral: Negative for dysphoric mood and sleep disturbance. The patient is not nervous/anxious.            Objective:  Physical Exam  General: No acute distress.  Eyes: conjunctiva clear; pupils equally round and reactive  ENT: external ears and nose atraumatic; oropharynx clear  CV: no peripheral edema  Resp: normal respiratory effort  Skin: no rashes or wounds; normal turgor  Psych: mood and affect appropriate; recent and remote memory intact    Vitals:    05/27/20 1339   Weight: 109 kg (240 lb)   Height: 175.3 cm (69\")         05/27/20  1339   Weight: " 109 kg (240 lb)     Body mass index is 35.44 kg/m².      Back Exam     Tenderness   The patient is experiencing tenderness in the cervical.    Range of Motion   Lateral bend right: abnormal   Lateral bend left: abnormal   Rotation right: abnormal   Rotation left: abnormal     Comments:  Positive spurling's exam, elicits pain when tested bilaterally            Imaging:  Cervical Spine X-Ray    Indication: Pain  Views: AP and Lateral    Findings:  No fracture  No bony lesions  Soft tissues normal  Degenerative disc disease greatest C6-C7 C4-5 to C6  No prior studies are available for comparison.    Assessment:        1. Pain    2. Radiculopathy of cervical spine    3. Trapezius muscle strain, right, initial encounter    4. Greater trochanteric bursitis, left           Plan:  1. Discussed plan of care with patient. Will proceed with cervical spine MRI. Will plan to see her back in clinic after completion of testing to discuss results and further plan of care.  Encouraged to continue with the meloxicam cyclobenzaprine as needed.  We will plan to see her back in clinic after completion of MRI of cervical spine to discuss results and further plan of care.  She verbalized understanding of information agrees with plan of care.  Denies other concerns present time.  Orders:  Orders Placed This Encounter   Procedures   • XR Spine Cervical 2 or 3 View   • MRI Cervical Spine Without Contrast       Medications:  No orders of the defined types were placed in this encounter.      Followup:  No follow-ups on file.    Jaye was seen today for follow-up and pain.    Diagnoses and all orders for this visit:    Pain  -     XR Spine Cervical 2 or 3 View    Radiculopathy of cervical spine  -     MRI Cervical Spine Without Contrast; Future    Trapezius muscle strain, right, initial encounter    Greater trochanteric bursitis, left      Dictated utilizing Dragon dictation

## 2020-05-27 NOTE — TELEPHONE ENCOUNTER
Please callpatient and ask if she has requested refills on these mediines. If she has, please ask her to schedule a video visit or in person visit for further evaluation and management.

## 2020-05-28 NOTE — TELEPHONE ENCOUNTER
I spoke with Jaye and she did request these medication, but she said the doctor who is taking care of her neck is filling them.  DAVID Beverly CMA

## 2020-05-28 NOTE — TELEPHONE ENCOUNTER
Thank you for this helpful information. Please call the pharmacy and ask them to send these medication refill requests to the patients provider that has been prescribing these medications to her, and please let me know who that provider is so I can update her medicine list to reflect this. Thank you.

## 2020-05-29 ENCOUNTER — TELEMEDICINE (OUTPATIENT)
Dept: FAMILY MEDICINE CLINIC | Facility: CLINIC | Age: 60
End: 2020-05-29

## 2020-05-29 ENCOUNTER — TELEPHONE (OUTPATIENT)
Dept: FAMILY MEDICINE CLINIC | Facility: CLINIC | Age: 60
End: 2020-05-29

## 2020-05-29 DIAGNOSIS — G25.81 RLS (RESTLESS LEGS SYNDROME): Primary | ICD-10-CM

## 2020-05-29 DIAGNOSIS — G44.89 OTHER HEADACHE SYNDROME: ICD-10-CM

## 2020-05-29 PROCEDURE — 99214 OFFICE O/P EST MOD 30 MIN: CPT | Performed by: FAMILY MEDICINE

## 2020-05-29 RX ORDER — ACETAMINOPHEN 500 MG
500 TABLET ORAL DAILY PRN
Qty: 60 TABLET | Refills: 2 | Status: SHIPPED | OUTPATIENT
Start: 2020-05-29 | End: 2021-06-02

## 2020-05-29 RX ORDER — ROPINIROLE 0.25 MG/1
0.25 TABLET, FILM COATED ORAL NIGHTLY
Qty: 30 TABLET | Refills: 2 | Status: SHIPPED | OUTPATIENT
Start: 2020-05-29 | End: 2020-11-30

## 2020-05-29 NOTE — PROGRESS NOTES
Subjective   Jaye Rivas is a 59 y.o. female is here for No chief complaint on file.      You have chosen to receive care through a telehealth visit.  Do you consent to use a video/audio connection for your medical care today? Yes    History of Present Illness       Pt has migraine headaches and started on Meloxicam which she states has been helping some.     She has RLS that is affecting her ADL'S and she would like some medicine to help with it.      The following portions of the patient's history were reviewed and updated as appropriate: allergies, current medications, past family history, past medical history, past social history, past surgical history and problem list.     reports that she has never smoked. She has never used smokeless tobacco. She reports that she does not drink alcohol or use drugs.    Review of Systems   Constitutional: Negative for activity change and unexpected weight change.   Respiratory: Negative for shortness of breath and wheezing.    Cardiovascular: Negative for chest pain and palpitations.   Gastrointestinal: Negative for abdominal pain, blood in stool and constipation.   Genitourinary: Negative for difficulty urinating and hematuria.   Musculoskeletal: Negative for gait problem.   Skin: Negative for color change and rash.        PHQ-9 Depression Screening  Little interest or pleasure in doing things?     Feeling down, depressed, or hopeless?     Trouble falling or staying asleep, or sleeping too much?     Feeling tired or having little energy?     Poor appetite or overeating?     Feeling bad about yourself - or that you are a failure or have let yourself or your family down?     Trouble concentrating on things, such as reading the newspaper or watching television?     Moving or speaking so slowly that other people could have noticed? Or the opposite - being so fidgety or restless that you have been moving around a lot more than usual?     Thoughts that you would be better off dead,  or of hurting yourself in some way?     PHQ-9 Total Score     If you checked off any problems, how difficult have these problems made it for you to do your work, take care of things at home, or get along with other people?           Objective   There were no vitals taken for this visit.  Physical Exam   Constitutional: She appears well-developed and well-nourished.   HENT:   Head: Atraumatic.   Pulmonary/Chest: Effort normal. No stridor.   Neurological: She is alert.   Skin: No rash noted.   Psychiatric: She has a normal mood and affect.        Procedures    Assessment/Plan   Diagnoses and all orders for this visit:    1. RLS (restless legs syndrome) (Primary)  Assessment & Plan:  Start Requip 0.25 QHS.     Orders:  -     rOPINIRole (Requip) 0.25 MG tablet; Take 1 tablet by mouth Every Night. Take 1 hour before bedtime.  Dispense: 30 tablet; Refill: 2    2. Other headache syndrome  -     acetaminophen (TYLENOL) 500 MG tablet; Take 1 tablet by mouth Daily As Needed for Mild Pain  or Headache.  Dispense: 60 tablet; Refill: 2          I spent over 25 minutes with the patient, of which more than 50% was counseling, including sleep and headache management.

## 2020-05-29 NOTE — TELEPHONE ENCOUNTER
I called the patient and let her know that she has an appointment scheduled with me on June 5, and that I need for her to call or reschedule that because I have a medical conference that day and we will not be seeing patients on June 5.

## 2020-05-29 NOTE — PATIENT INSTRUCTIONS
Restless Legs Syndrome  Restless legs syndrome is a condition that causes uncomfortable feelings or sensations in the legs, especially while sitting or lying down. The sensations usually cause an overwhelming urge to move the legs. The arms can also sometimes be affected.  The condition can range from mild to severe. The symptoms often interfere with a person's ability to sleep.  What are the causes?  The cause of this condition is not known.  What increases the risk?  The following factors may make you more likely to develop this condition:  · Being older than 50.  · Pregnancy.  · Being a woman. In general, the condition is more common in women than in men.  · A family history of the condition.  · Having iron deficiency.  · Overuse of caffeine, nicotine, or alcohol.  · Certain medical conditions, such as kidney disease, Parkinson's disease, or nerve damage.  · Certain medicines, such as those for high blood pressure, nausea, colds, allergies, depression, and some heart conditions.  What are the signs or symptoms?  The main symptom of this condition is uncomfortable sensations in the legs, such as:  · Pulling.  · Tingling.  · Prickling.  · Throbbing.  · Crawling.  · Burning.  Usually, the sensations:  · Affect both sides of the body.  · Are worse when you sit or lie down.  · Are worse at night. These may wake you up or make it difficult to fall asleep.  · Make you have a strong urge to move your legs.  · Are temporarily relieved by moving your legs.  The arms can also be affected, but this is rare. People who have this condition often have tiredness during the day because of their lack of sleep at night.  How is this diagnosed?  This condition may be diagnosed based on:  · Your symptoms.  · Blood tests.  In some cases, you may be monitored in a sleep lab by a specialist (a sleep study). This can detect any disruptions in your sleep.  How is this treated?  This condition is treated by managing the symptoms. This may  include:  · Lifestyle changes, such as exercising, using relaxation techniques, and avoiding caffeine, alcohol, or tobacco.  · Medicines. Anti-seizure medicines may be tried first.  Follow these instructions at home:         General instructions  · Take over-the-counter and prescription medicines only as told by your health care provider.  · Use methods to help relieve the uncomfortable sensations, such as:  ? Massaging your legs.  ? Walking or stretching.  ? Taking a cold or hot bath.  · Keep all follow-up visits as told by your health care provider. This is important.  Lifestyle  · Practice good sleep habits. For example, go to bed and get up at the same time every day. Most adults should get 7-9 hours of sleep each night.  · Exercise regularly. Try to get at least 30 minutes of exercise most days of the week.  · Practice ways of relaxing, such as yoga or meditation.  · Avoid caffeine and alcohol.  · Do not use any products that contain nicotine or tobacco, such as cigarettes and e-cigarettes. If you need help quitting, ask your health care provider.  Contact a health care provider if:  · Your symptoms get worse or they do not improve with treatment.  Summary  · Restless legs syndrome is a condition that causes uncomfortable feelings or sensations in the legs, especially while sitting or lying down.  · The symptoms often interfere with a person's ability to sleep.  · This condition is treated by managing the symptoms. You may need to make lifestyle changes or take medicines.  This information is not intended to replace advice given to you by your health care provider. Make sure you discuss any questions you have with your health care provider.  Document Released: 12/08/2003 Document Revised: 01/07/2019 Document Reviewed: 01/07/2019  Elsevier Patient Education © 2020 [employer]

## 2020-06-08 ENCOUNTER — TELEMEDICINE (OUTPATIENT)
Dept: FAMILY MEDICINE CLINIC | Facility: CLINIC | Age: 60
End: 2020-06-08

## 2020-06-08 DIAGNOSIS — G89.29 CHRONIC BILATERAL LOW BACK PAIN WITH BILATERAL SCIATICA: ICD-10-CM

## 2020-06-08 DIAGNOSIS — E78.2 MIXED HYPERLIPIDEMIA: ICD-10-CM

## 2020-06-08 DIAGNOSIS — G25.81 RLS (RESTLESS LEGS SYNDROME): Primary | ICD-10-CM

## 2020-06-08 DIAGNOSIS — M54.41 CHRONIC BILATERAL LOW BACK PAIN WITH BILATERAL SCIATICA: ICD-10-CM

## 2020-06-08 DIAGNOSIS — M54.42 CHRONIC BILATERAL LOW BACK PAIN WITH BILATERAL SCIATICA: ICD-10-CM

## 2020-06-08 PROBLEM — R11.2 NON-INTRACTABLE VOMITING WITH NAUSEA: Status: RESOLVED | Noted: 2020-02-10 | Resolved: 2020-06-08

## 2020-06-08 PROCEDURE — 99214 OFFICE O/P EST MOD 30 MIN: CPT | Performed by: FAMILY MEDICINE

## 2020-06-08 NOTE — PROGRESS NOTES
Subjective   Jaye Rivas is a 59 y.o. female is here for   Chief Complaint   Patient presents with   • Restless Legs Syndrome       You have chosen to receive care through a telehealth visit.  Do you consent to use a video/audio connection for your medical care today? Yes    History of Present Illness     Pt states the Requip is helping her RLS a lot and she is not having any RLS symptoms since she has been taking the medicine.    Pt states she was having severe neck pain and went to [facility] who ordered an MRI, which s scheduled for this Friday.    She states her BP at home has been normal.    The following portions of the patient's history were reviewed and updated as appropriate: allergies, current medications, past family history, past medical history, past social history, past surgical history and problem list.     reports that she has never smoked. She has never used smokeless tobacco. She reports that she does not drink alcohol or use drugs.    Review of Systems   Constitutional: Negative for activity change and unexpected weight change.   Respiratory: Negative for shortness of breath and wheezing.    Cardiovascular: Negative for chest pain and palpitations.   Gastrointestinal: Negative for abdominal pain, blood in stool and constipation.   Genitourinary: Negative for difficulty urinating and hematuria.   Musculoskeletal: Negative for gait problem.   Skin: Negative for color change and rash.        PHQ-9 Depression Screening  Little interest or pleasure in doing things?     Feeling down, depressed, or hopeless?     Trouble falling or staying asleep, or sleeping too much?     Feeling tired or having little energy?     Poor appetite or overeating?     Feeling bad about yourself - or that you are a failure or have let yourself or your family down?     Trouble concentrating on things, such as reading the newspaper or watching television?     Moving or speaking so slowly that other people could have  noticed? Or the opposite - being so fidgety or restless that you have been moving around a lot more than usual?     Thoughts that you would be better off dead, or of hurting yourself in some way?     PHQ-9 Total Score     If you checked off any problems, how difficult have these problems made it for you to do your work, take care of things at home, or get along with other people?           Objective   There were no vitals taken for this visit.  Physical Exam   Constitutional: She appears well-developed and well-nourished. No distress.   HENT:   Head: Atraumatic.   Eyes: EOM are normal.   Pulmonary/Chest: Effort normal. No stridor.   Neurological: She is alert.   Skin: No rash noted. She is not diaphoretic.        Procedures    Assessment/Plan   Diagnoses and all orders for this visit:    1. RLS (restless legs syndrome) (Primary)  Assessment & Plan:  Controlled with Requip 0.25 QHS.No medication side effects. Continue current treatment.        2. Mixed hyperlipidemia  Assessment & Plan:  Controlled with Lipitor 10 mg daily. No medication side effects. Continue current treatment.        3. Chronic bilateral low back pain with bilateral sciatica  Assessment & Plan:  Following with Orthopedics, [facility].  MRI pending.              I spent over 25 minutes with the patient, of which more than 50% was counseling.

## 2020-06-12 ENCOUNTER — HOSPITAL ENCOUNTER (OUTPATIENT)
Dept: MRI IMAGING | Facility: HOSPITAL | Age: 60
Discharge: HOME | End: 2020-06-12
Admitting: NURSE PRACTITIONER

## 2020-06-12 DIAGNOSIS — M54.12 RADICULOPATHY OF CERVICAL SPINE: ICD-10-CM

## 2020-06-12 PROCEDURE — 72141 MRI NECK SPINE W/O DYE: CPT

## 2020-06-15 ENCOUNTER — TELEPHONE (OUTPATIENT)
Dept: ORTHOPEDIC SURGERY | Facility: CLINIC | Age: 60
End: 2020-06-15

## 2020-06-15 DIAGNOSIS — M54.12 RADICULOPATHY OF CERVICAL SPINE: ICD-10-CM

## 2020-06-15 DIAGNOSIS — M47.812 CERVICAL SPONDYLOSIS: Primary | ICD-10-CM

## 2020-06-15 NOTE — TELEPHONE ENCOUNTER
Call patient to discuss MRI results cervical spine:  Mri Cervical Spine Without Contrast    Result Date: 6/12/2020  MRI Spine Cervical WO INDICATION:  4 month history of neck pain with bilateral upper extremity numbness and tingling TECHNIQUE: MRI of the cervical spine without IV contrast. COMPARISON:  No pertinent prior study FINDINGS: The sagittal alignment shows reversal of the normal cervical lordosis. Vertebral body heights are maintained. Vertebral bodies demonstrate normal signal intensity. The visualized portions of the intracranial contents are within normal limits. The cervicomedullary junction is normal. The spinal cord is of normal course and caliber. No syrinx is seen. No clearly reproducible areas of signal abnormality identified. At C2-C3, disc desiccation and minimal loss of disc height. Mild facet arthropathy. No spinal canal or neural foraminal compromise. At C3-C4, disc desiccation and minimal loss of disc height. Mild effacement of the ventral thecal sac. No cord flattening. Mild facet arthropathy. No neural foraminal compromise. At C4-C5, broad-based central disc herniation with effacement of the ventral thecal sac and mild cord flattening. Bilateral facet arthropathy. Mild narrowing of the neural foramina bilaterally. At C5-C6, disc desiccation and moderate loss of disc height. Broad-based central disc osteophyte complex with effacement of the ventral thecal sac but no definite cord flattening. Facet arthropathy and mild narrowing of the neural foramina bilaterally. At C6-C7, disc desiccation and mild loss of disc height. Broad-based central disc osteophyte complex with effacement of the ventral thecal sac and mild cord flattening. Mild narrowing of the neural foramina bilaterally. Mild facet arthropathy. At C7-T1, no significant disc degeneration. Minimal facet arthropathy. No spinal canal or neural foraminal compromise. Paravertebral soft tissues demonstrate no acute findings.     Cervical  spondylosis as described. Please see the above report for complete description of the individual levels. Signer Name: Nando Hope MD  Signed: 6/12/2020 12:00 PM  Workstation Name: [id number]  [facility]    Plan:  1.  Discussed plan of care with patient.  She does not wish to proceed with referral for pain management at this time to discuss epidural injections.  She does wish to proceed with physical therapy.  Order placed for PT discussed physical therapy will call her to schedule.  If not improving encouraged to call back to clinic will refer for pain management for epidural injections.  She verbalized understanding of all information agrees with plan of care.  Denies other concerns present time.

## 2020-06-29 DIAGNOSIS — M54.42 CHRONIC BILATERAL LOW BACK PAIN WITH BILATERAL SCIATICA: ICD-10-CM

## 2020-06-29 DIAGNOSIS — S46.811A TRAPEZIUS MUSCLE STRAIN, RIGHT, INITIAL ENCOUNTER: ICD-10-CM

## 2020-06-29 DIAGNOSIS — M54.41 CHRONIC BILATERAL LOW BACK PAIN WITH BILATERAL SCIATICA: ICD-10-CM

## 2020-06-29 DIAGNOSIS — G44.89 OTHER HEADACHE SYNDROME: ICD-10-CM

## 2020-06-29 DIAGNOSIS — G89.29 CHRONIC BILATERAL LOW BACK PAIN WITH BILATERAL SCIATICA: ICD-10-CM

## 2020-06-29 RX ORDER — CYCLOBENZAPRINE HCL 10 MG
TABLET ORAL
Qty: 30 TABLET | Refills: 10 | OUTPATIENT
Start: 2020-06-29

## 2020-06-29 RX ORDER — MELOXICAM 7.5 MG/1
7.5 TABLET ORAL DAILY
Qty: 30 TABLET | Refills: 2 | Status: SHIPPED | OUTPATIENT
Start: 2020-06-29 | End: 2020-07-08 | Stop reason: ALTCHOICE

## 2020-06-29 RX ORDER — CYCLOBENZAPRINE HCL 10 MG
10 TABLET ORAL 3 TIMES DAILY PRN
Qty: 30 TABLET | Refills: 2 | Status: SHIPPED | OUTPATIENT
Start: 2020-06-29 | End: 2020-07-27

## 2020-06-30 ENCOUNTER — HOSPITAL ENCOUNTER (OUTPATIENT)
Dept: PHYSICAL THERAPY | Facility: HOSPITAL | Age: 60
Setting detail: THERAPIES SERIES
Discharge: HOME | End: 2020-06-30

## 2020-06-30 DIAGNOSIS — M47.812 CERVICAL SPONDYLOSIS: Primary | ICD-10-CM

## 2020-06-30 PROCEDURE — 97161 PT EVAL LOW COMPLEX 20 MIN: CPT

## 2020-07-08 ENCOUNTER — TELEMEDICINE (OUTPATIENT)
Dept: FAMILY MEDICINE CLINIC | Facility: CLINIC | Age: 60
End: 2020-07-08

## 2020-07-08 DIAGNOSIS — G44.89 OTHER HEADACHE SYNDROME: Primary | ICD-10-CM

## 2020-07-08 PROCEDURE — 99213 OFFICE O/P EST LOW 20 MIN: CPT | Performed by: FAMILY MEDICINE

## 2020-07-08 RX ORDER — DICLOFENAC SODIUM 25 MG/1
25 TABLET, DELAYED RELEASE ORAL DAILY PRN
Qty: 30 TABLET | Refills: 1 | Status: SHIPPED | OUTPATIENT
Start: 2020-07-08 | End: 2021-01-19

## 2020-07-08 NOTE — PROGRESS NOTES
Subjective   Jaye Rivas is a 60 y.o. female is here for   Chief Complaint   Patient presents with   • Headache       You have chosen to receive care through a telehealth visit.  Do you consent to use a video/audio connection for your medical care today? Yes    History of Present Illness     Patient states that she has been having headaches.  She has a history of migraine headaches.  She was on medicine in the past that helped but she does not remember the name of it.  She tried Tylenol that did not help.  She also tried Aleve but that did not help either. She is also having dental problems and states that that may be exacerbating her headaches.  She is trying to get an appointment with a dentist.      The following portions of the patient's history were reviewed and updated as appropriate: allergies, current medications, past family history, past medical history, past social history, past surgical history and problem list.     reports that she has never smoked. She has never used smokeless tobacco. She reports that she does not drink alcohol or use drugs.    Review of Systems   Constitutional: Negative for activity change and unexpected weight change.   Respiratory: Negative for shortness of breath and wheezing.    Cardiovascular: Negative for chest pain and palpitations.   Gastrointestinal: Negative for abdominal pain, blood in stool and constipation.   Genitourinary: Negative for difficulty urinating and hematuria.   Musculoskeletal: Negative for gait problem.   Skin: Negative for color change and rash.   Neurological: Positive for headaches.        PHQ-9 Depression Screening  Little interest or pleasure in doing things?     Feeling down, depressed, or hopeless?     Trouble falling or staying asleep, or sleeping too much?     Feeling tired or having little energy?     Poor appetite or overeating?     Feeling bad about yourself - or that you are a failure or have let yourself or your family down?     Trouble  concentrating on things, such as reading the newspaper or watching television?     Moving or speaking so slowly that other people could have noticed? Or the opposite - being so fidgety or restless that you have been moving around a lot more than usual?     Thoughts that you would be better off dead, or of hurting yourself in some way?     PHQ-9 Total Score     If you checked off any problems, how difficult have these problems made it for you to do your work, take care of things at home, or get along with other people?           Objective   There were no vitals taken for this visit.  Physical Exam   Constitutional: She appears well-developed and well-nourished. No distress.   HENT:   Head: Normocephalic and atraumatic.   Eyes: EOM are normal. Right eye exhibits no discharge. Left eye exhibits no discharge.   Neck: Neck normal appearance.  Pulmonary/Chest: Effort normal. No stridor.   Neurological: She is alert.   Skin: No rash noted. She is not diaphoretic.   Psychiatric: She has a normal mood and affect.        Procedures    Assessment/Plan   Diagnoses and all orders for this visit:    1. Other headache syndrome (Primary)  -     diclofenac (VOLTAREN) 25 MG EC tablet; Take 1 tablet by mouth Daily As Needed (Headache).  Dispense: 30 tablet; Refill: 1  -     Ambulatory Referral to Neurology

## 2020-07-13 ENCOUNTER — APPOINTMENT (OUTPATIENT)
Dept: PHYSICAL THERAPY | Facility: HOSPITAL | Age: 60
End: 2020-07-13

## 2020-07-17 NOTE — PROGRESS NOTES
RM:________    Referral Provider: No ref. provider found yT Hermosillo Jr., DO    NEW PATIENT/ CONSULT  PREVIOUS CARDIOLOGIST:   CARDIAC TESTING:     : 1960   AGE: 60 y.o.    2020  REASON FOR VISIT/  CC: PALPITATIONS       WT: ____________ BP: __________L __________R/ HR_______________    CHEST PAIN: _____________    SOA: ____________PALPS: __________      LIGHTHEADED: ___________ FATIGUE: _______________ EDEMA______________  ALLERGIES:  Patient has no known allergies.  SMOKING HISTORY  Social History     Tobacco Use   • Smoking status: Never Smoker   • Smokeless tobacco: Never Used   Substance Use Topics   • Alcohol use: No     Frequency: Never   • Drug use: No     Single     CHILDREN: _______________________       CAFFEINE USE________  ALCOHOL _____________  OCCUPATION_____________  Past Surgical History:   Procedure Laterality Date   • BACK SURGERY     • DILATATION AND CURETTAGE     • EYE SURGERY                  FAMILY HISTORY  HEART DISEASE  CHF  DIABETES  CARDIAC ARREST  STROKE  CANCER  ANEURYSM                                                             H/O: MI_____   STROKE________   GOUT_____   ANEMIA______     CAROTID________ HIV____ CAD_______ HYPERCHOL _____    H/O: CHF _____   RF____ DM___ HTN_______PVD____THYROID DISEASE_______    PMH: GI ____   HEPATITIS ___ KIDNEY DISEASE ___ LUNG DISEASE _______     SLEEP APNEA ____ BLOOD CLOTS ____ DVT ____ VEIN STRIPPING ___________     CANCER _________________________________ CHEMO/ RADIATION__________

## 2020-07-21 ENCOUNTER — OFFICE VISIT (OUTPATIENT)
Dept: CARDIOLOGY | Facility: CLINIC | Age: 60
End: 2020-07-21

## 2020-07-21 VITALS
WEIGHT: 237 LBS | HEART RATE: 77 BPM | OXYGEN SATURATION: 97 % | DIASTOLIC BLOOD PRESSURE: 80 MMHG | SYSTOLIC BLOOD PRESSURE: 112 MMHG | HEIGHT: 69 IN | RESPIRATION RATE: 16 BRPM | BODY MASS INDEX: 35.1 KG/M2

## 2020-07-21 DIAGNOSIS — R60.0 LOWER EXTREMITY EDEMA: Primary | ICD-10-CM

## 2020-07-21 DIAGNOSIS — Z01.810 PREOP CARDIOVASCULAR EXAM: ICD-10-CM

## 2020-07-21 PROBLEM — S46.811A TRAPEZIUS MUSCLE STRAIN, RIGHT, INITIAL ENCOUNTER: Status: RESOLVED | Noted: 2020-04-15 | Resolved: 2020-07-21

## 2020-07-21 PROBLEM — M70.62 GREATER TROCHANTERIC BURSITIS, LEFT: Status: RESOLVED | Noted: 2020-04-15 | Resolved: 2020-07-21

## 2020-07-21 PROBLEM — E66.01 CLASS 2 SEVERE OBESITY DUE TO EXCESS CALORIES WITH SERIOUS COMORBIDITY AND BODY MASS INDEX (BMI) OF 37.0 TO 37.9 IN ADULT: Status: RESOLVED | Noted: 2019-12-11 | Resolved: 2020-07-21

## 2020-07-21 PROBLEM — R00.2 PALPITATIONS: Status: RESOLVED | Noted: 2019-12-11 | Resolved: 2020-07-21

## 2020-07-21 PROCEDURE — 93000 ELECTROCARDIOGRAM COMPLETE: CPT | Performed by: INTERNAL MEDICINE

## 2020-07-21 PROCEDURE — 99204 OFFICE O/P NEW MOD 45 MIN: CPT | Performed by: INTERNAL MEDICINE

## 2020-07-21 RX ORDER — HYDROCODONE BITARTRATE AND ACETAMINOPHEN 5; 325 MG/1; MG/1
1 TABLET ORAL EVERY 6 HOURS PRN
COMMUNITY
End: 2021-06-02

## 2020-07-21 NOTE — PROGRESS NOTES
Date of Office Visit: 20  Encounter Provider: Michael Garrett MD  Place of Service: [facility] CARDIOLOGY  Patient Name: Jaye Rivas  :1960    Chief Complaint   Patient presents with   • Edema   :     HPI:     Ms. Rivas is 60 y.o. and presents today for the evaluation of leg swelling.  The referral also states that it was for palpitations, but the patient declines this symptom, although she says she may have had some rare palpitations in the past, but she can't remember well.    She reported leg swelling in late .  Unfortunately, she was living in her car as she had no home.  She was eating food from restaurants/rest stops.  She was started on furosemide 40mg daily/KCl 20meq daily, which rapidly resolved the issue.  Her doses were then decreased to 20mg/10meq daily.  She had an echo in 2020 which was completely normal.     She currently has no swelling.  She is back in an apartment.  She denies orthopnea, PND, palpitations, lightheadedness, syncope, or chest pain. She is a non-smoker.  Her father had CHF.      She will be having some sort of implant placed in her low back in the next two weeks (I don't know if it's a spinal cord stimulator or a pain medication pump).  She has chronic back pain.     Past Medical History:   Diagnosis Date   • Cervical spondylosis 6/15/2020   • Chronic back pain    • Depression    • History of sleep apnea    • Lumbar spondylosis 8/15/2018   • Migraine    • Mixed hyperlipidemia 2020: .  Family history of heart disease, father.  Start Lipitor 10 mg daily.  2020: Controlled with Lipitor 10 mg daily. No medication side effects. Continue current treatment.       Past Surgical History:   Procedure Laterality Date   • BACK SURGERY     • DILATATION AND CURETTAGE     • EYE SURGERY         Social History     Socioeconomic History   • Marital status: Single     Spouse name: Not on file   • Number of children: Not on  "file   • Years of education: Not on file   • Highest education level: Not on file   Tobacco Use   • Smoking status: Never Smoker   • Smokeless tobacco: Never Used   • Tobacco comment: daily caffiene   Substance and Sexual Activity   • Alcohol use: No     Frequency: Never   • Drug use: No   • Sexual activity: Defer       Family History   Problem Relation Age of Onset   • No Known Problems Mother    • Heart failure Father    • Breast cancer Maternal Aunt    • Breast cancer Maternal Grandmother        Review of Systems   Musculoskeletal: Positive for back pain, joint pain and myalgias.   Psychiatric/Behavioral: Positive for depression.   All other systems reviewed and are negative.      No Known Allergies      Current Outpatient Medications:   •  acetaminophen (TYLENOL) 500 MG tablet, Take 1 tablet by mouth Daily As Needed for Mild Pain  or Headache., Disp: 60 tablet, Rfl: 2  •  atorvastatin (LIPITOR) 10 MG tablet, Take 1 tablet by mouth Daily., Disp: 90 tablet, Rfl: 1  •  cyclobenzaprine (FLEXERIL) 10 MG tablet, Take 1 tablet by mouth 3 (Three) Times a Day As Needed for Muscle Spasms., Disp: 30 tablet, Rfl: 2  •  diclofenac (VOLTAREN) 25 MG EC tablet, Take 1 tablet by mouth Daily As Needed (Headache)., Disp: 30 tablet, Rfl: 1  •  FLUoxetine (PROzac) 20 MG capsule, Take 1 capsule by mouth Daily., Disp: 90 capsule, Rfl: 1  •  furosemide (LASIX) 20 MG tablet, Take 1 tablet by mouth Daily., Disp: 90 tablet, Rfl: 1  •  HYDROcodone-acetaminophen (NORCO) 5-325 MG per tablet, Take 1 tablet by mouth Every 6 (Six) Hours As Needed., Disp: , Rfl:   •  potassium chloride (K-DUR) 10 MEQ CR tablet, Take 1 tablet by mouth Daily., Disp: 90 tablet, Rfl: 1  •  rOPINIRole (Requip) 0.25 MG tablet, Take 1 tablet by mouth Every Night. Take 1 hour before bedtime., Disp: 30 tablet, Rfl: 2      Objective:     Vitals:    07/21/20 0939   BP: 112/80   Pulse: 77   Resp: 16   SpO2: 97%   Weight: 108 kg (237 lb)   Height: 175.3 cm (69\")     Body " mass index is 35 kg/m².    Physical Exam   Constitutional: She is oriented to person, place, and time. She appears well-developed and well-nourished.   HENT:   Head: Normocephalic.   Nose: Nose normal.   Mouth/Throat: Oropharynx is clear and moist.   Eyes: Pupils are equal, round, and reactive to light. Conjunctivae and EOM are normal.   Neck: Normal range of motion. No JVD present.   Cardiovascular: Normal rate, regular rhythm, normal heart sounds and intact distal pulses.   No murmur heard.  Pulmonary/Chest: Effort normal and breath sounds normal.   Abdominal: Soft. There is no tenderness.   Musculoskeletal: Normal range of motion. She exhibits no edema.   Neurological: She is alert and oriented to person, place, and time. No cranial nerve deficit.   Skin: Skin is warm and dry. No rash noted.   Psychiatric: She has a normal mood and affect. Her behavior is normal. Judgment and thought content normal.   Vitals reviewed.        ECG 12 Lead  Date/Time: 7/21/2020 10:13 AM  Performed by: Michael Garrett MD  Authorized by: Michael Garrett MD   Comparison: not compared with previous ECG   Previous ECG: no previous ECG available  Rhythm: sinus rhythm  Rhythm comments: sinus arrhythmia  Conduction: conduction normal  Q waves: III    ST Segments: ST segments normal  T Waves: T waves normal  QRS axis: normal  Other: no other findings    Clinical impression: normal ECG              Assessment:       Diagnosis Plan   1. Lower extremity edema     2. Preop cardiovascular exam            Plan:       Ms Rivas's pedal edema was due to venous insufficiency from long periods of sitting and overly salty meals.  She has a normal echocardiogram.  Honestly, I don't think she needs any diuretic at this time, but I will defer that to Dr Hermosillo.      She has no anginal symptoms and no worrisome exam findings.  Her EKG shows sinus arrhythmia, which is physiologic.  She is at low risk of major adverse CV events with surgery.    She can follow up  with us as needed.    Sincerely,       Michael Garrett MD

## 2020-07-27 DIAGNOSIS — G89.29 CHRONIC BILATERAL LOW BACK PAIN WITH BILATERAL SCIATICA: ICD-10-CM

## 2020-07-27 DIAGNOSIS — S46.811A TRAPEZIUS MUSCLE STRAIN, RIGHT, INITIAL ENCOUNTER: ICD-10-CM

## 2020-07-27 DIAGNOSIS — M54.41 CHRONIC BILATERAL LOW BACK PAIN WITH BILATERAL SCIATICA: ICD-10-CM

## 2020-07-27 DIAGNOSIS — M54.42 CHRONIC BILATERAL LOW BACK PAIN WITH BILATERAL SCIATICA: ICD-10-CM

## 2020-07-27 RX ORDER — CYCLOBENZAPRINE HCL 10 MG
TABLET ORAL
Qty: 30 TABLET | Refills: 3 | Status: SHIPPED | OUTPATIENT
Start: 2020-07-27 | End: 2020-10-27

## 2020-08-26 DIAGNOSIS — M54.41 CHRONIC BILATERAL LOW BACK PAIN WITH BILATERAL SCIATICA: ICD-10-CM

## 2020-08-26 DIAGNOSIS — G89.29 CHRONIC BILATERAL LOW BACK PAIN WITH BILATERAL SCIATICA: ICD-10-CM

## 2020-08-26 DIAGNOSIS — S46.811A TRAPEZIUS MUSCLE STRAIN, RIGHT, INITIAL ENCOUNTER: ICD-10-CM

## 2020-08-26 DIAGNOSIS — M54.42 CHRONIC BILATERAL LOW BACK PAIN WITH BILATERAL SCIATICA: ICD-10-CM

## 2020-09-25 ENCOUNTER — TELEPHONE (OUTPATIENT)
Dept: ORTHOPEDIC SURGERY | Facility: CLINIC | Age: 60
End: 2020-09-25

## 2020-09-25 DIAGNOSIS — M47.812 CERVICAL SPONDYLOSIS: Primary | ICD-10-CM

## 2020-09-25 DIAGNOSIS — M54.12 RADICULOPATHY OF CERVICAL SPINE: ICD-10-CM

## 2020-09-25 DIAGNOSIS — S46.811A TRAPEZIUS MUSCLE STRAIN, RIGHT, INITIAL ENCOUNTER: ICD-10-CM

## 2020-09-30 ENCOUNTER — HOSPITAL ENCOUNTER (OUTPATIENT)
Dept: PHYSICAL THERAPY | Facility: HOSPITAL | Age: 60
Setting detail: THERAPIES SERIES
Discharge: HOME | End: 2020-09-30

## 2020-09-30 DIAGNOSIS — M47.812 CERVICAL SPONDYLOSIS: Primary | ICD-10-CM

## 2020-09-30 PROCEDURE — 97012 MECHANICAL TRACTION THERAPY: CPT

## 2020-09-30 PROCEDURE — 97110 THERAPEUTIC EXERCISES: CPT

## 2020-10-07 ENCOUNTER — HOSPITAL ENCOUNTER (OUTPATIENT)
Dept: PHYSICAL THERAPY | Facility: HOSPITAL | Age: 60
Setting detail: THERAPIES SERIES
Discharge: HOME | End: 2020-10-07

## 2020-10-07 DIAGNOSIS — M47.812 CERVICAL SPONDYLOSIS: Primary | ICD-10-CM

## 2020-10-07 PROCEDURE — 97012 MECHANICAL TRACTION THERAPY: CPT

## 2020-10-07 PROCEDURE — 97110 THERAPEUTIC EXERCISES: CPT

## 2020-10-07 NOTE — THERAPY TREATMENT NOTE
Outpatient Physical Therapy Ortho Treatment Note  JONATHON Jacobs     Patient Name: Jaye Rivas  : 1960  MRN: [id number]  Today's Date: 10/7/2020      Visit Date: 10/07/2020    Visit Dx:    ICD-10-CM ICD-9-CM   1. Cervical spondylosis  M47.812 721.0       Patient Active Problem List   Diagnosis   • S/P lumbar fusion   • Painful orthopaedic hardware (CMS/HCC)   • Lumbar spondylosis   • Depression with anxiety   • Degeneration of lumbar intervertebral disc   • Chronic lower back pain   • Capsulitis of shoulder   • Lower extremity edema   • Pain in both lower extremities   • Abnormal mammogram   • Mixed hyperlipidemia   • Moderate episode of recurrent major depressive disorder (CMS/HCC)   • Other headache syndrome   • Radiculopathy of cervical spine   • RLS (restless legs syndrome)   • Cervical spondylosis        Past Medical History:   Diagnosis Date   • Cervical spondylosis 6/15/2020   • Chronic back pain    • Depression    • History of sleep apnea    • Lumbar spondylosis 8/15/2018   • Migraine    • Mixed hyperlipidemia 2020: .  Family history of heart disease, father.  Start Lipitor 10 mg daily.  2020: Controlled with Lipitor 10 mg daily. No medication side effects. Continue current treatment.        Past Surgical History:   Procedure Laterality Date   • BACK SURGERY     • DILATATION AND CURETTAGE     • EYE SURGERY                         PT Assessment/Plan     Row Name 10/07/20 1000          PT Assessment    Assessment Comments  Progressed patient with duration of cervical traction today. Patient tolerated this well and without complaints.  -AS        PT Plan    PT Plan Comments  Continue with cureent treatment plan.  -AS       User Key  (r) = Recorded By, (t) = Taken By, (c) = Cosigned By    Initials Name Provider Type    AS Nikhil Lebron, PT Physical Therapist          Modalities     Row Name 10/07/20 1000             Moist Heat    MH Applied  Yes  -AS      Location  Neck  "- Sitting  -AS      Rx Minutes  10 mins  -AS      MH Prior to Rx  Yes  -AS         Traction 84578    Traction Type  Cervical  -AS      Rx Minutes  20  -AS      Duration  Intermittent  -AS      Position  Hook-lying  -AS      Weight  15  -AS      Hold  60  -AS      Relax  20  -AS        User Key  (r) = Recorded By, (t) = Taken By, (c) = Cosigned By    Initials Name Provider Type    AS Nikhil Lebron, PT Physical Therapist        OP Exercises     Row Name 10/07/20 1000             Subjective Comments    Subjective Comments  Patient states she is doing \"ok\" and states the traction \"helped a little\". She states she is having a \"migraine that has lasted 4-5 days\".  -AS         Exercise 1    Exercise Name 1  Right UT Stretch  -AS      Reps 1  10  -AS      Time 1  10 sec hold each  -AS         Exercise 2    Exercise Name 2  Right Levator Stretch  -AS      Reps 2  10  -AS      Time 2  10 sec hold each  -AS        User Key  (r) = Recorded By, (t) = Taken By, (c) = Cosigned By    Initials Name Provider Type    AS Nikhil Lebron, PT Physical Therapist                                          Time Calculation:   Start Time: 1049  Stop Time: 1136  Time Calculation (min): 47 min  Therapy Charges for Today     Code Description Service Date Service Provider Modifiers Qty    08593526453  PT TRACTION CERVICAL 10/7/2020 Nikhil Lebron, PT GP 1    [id number]  PT THER PROC EA 15 MIN 10/7/2020 Nikhil Lebron, PT GP 1                    Nikhil Lebron, PT  10/7/2020     "

## 2020-10-15 ENCOUNTER — HOSPITAL ENCOUNTER (OUTPATIENT)
Dept: PHYSICAL THERAPY | Facility: HOSPITAL | Age: 60
Setting detail: THERAPIES SERIES
Discharge: HOME | End: 2020-10-15

## 2020-10-15 DIAGNOSIS — M47.812 CERVICAL SPONDYLOSIS: Primary | ICD-10-CM

## 2020-10-15 PROCEDURE — 97012 MECHANICAL TRACTION THERAPY: CPT

## 2020-10-15 NOTE — THERAPY TREATMENT NOTE
Outpatient Physical Therapy Ortho Treatment Note  JONATHON Jacobs     Patient Name: Jaye Rivas  : 1960  MRN: [id number]  Today's Date: 10/15/2020      Visit Date: 10/15/2020    Visit Dx:    ICD-10-CM ICD-9-CM   1. Cervical spondylosis  M47.812 721.0       Patient Active Problem List   Diagnosis   • S/P lumbar fusion   • Painful orthopaedic hardware (CMS/HCC)   • Lumbar spondylosis   • Depression with anxiety   • Degeneration of lumbar intervertebral disc   • Chronic lower back pain   • Capsulitis of shoulder   • Lower extremity edema   • Pain in both lower extremities   • Abnormal mammogram   • Mixed hyperlipidemia   • Moderate episode of recurrent major depressive disorder (CMS/HCC)   • Other headache syndrome   • Radiculopathy of cervical spine   • RLS (restless legs syndrome)   • Cervical spondylosis        Past Medical History:   Diagnosis Date   • Cervical spondylosis 6/15/2020   • Chronic back pain    • Depression    • History of sleep apnea    • Lumbar spondylosis 8/15/2018   • Migraine    • Mixed hyperlipidemia 2020: .  Family history of heart disease, father.  Start Lipitor 10 mg daily.  2020: Controlled with Lipitor 10 mg daily. No medication side effects. Continue current treatment.        Past Surgical History:   Procedure Laterality Date   • BACK SURGERY     • DILATATION AND CURETTAGE     • EYE SURGERY                         PT Assessment/Plan     Row Name 10/15/20 1000          PT Assessment    Assessment Comments  Progressed patient with duration of cervical traction today. Patient tolerated this well and without complaints.  -AS        PT Plan    PT Plan Comments  This is patient's last approved visit through her insurance.  -AS       User Key  (r) = Recorded By, (t) = Taken By, (c) = Cosigned By    Initials Name Provider Type    AS Nikhil Lebron, PT Physical Therapist          Modalities     Row Name 10/15/20 1000             Moist Heat    MH Applied  Yes   "-AS      Location  Neck - Sitting  -AS      Rx Minutes  10 mins  -AS      MH Prior to Rx  Yes  -AS         Traction 87237    Traction Type  Cervical  -AS      Rx Minutes  20  -AS      Duration  Intermittent  -AS      Position  Hook-lying  -AS      Weight  15  -AS      Hold  60  -AS      Relax  20  -AS        User Key  (r) = Recorded By, (t) = Taken By, (c) = Cosigned By    Initials Name Provider Type    AS Nikhil Lebron, PT Physical Therapist        OP Exercises     Row Name 10/15/20 1000             Subjective Comments    Subjective Comments  Patient states she is doing \"ok\" but \"pretty much feeling the same\".   -AS         Exercise 1    Exercise Name 1  Right UT Stretch  -AS      Reps 1  10  -AS      Time 1  10 sec hold each  -AS         Exercise 2    Exercise Name 2  Right Levator Stretch  -AS      Reps 2  10  -AS      Time 2  10 sec hold each  -AS        User Key  (r) = Recorded By, (t) = Taken By, (c) = Cosigned By    Initials Name Provider Type    AS Nikhil Lebron, PT Physical Therapist                                          Time Calculation:   Start Time: 1008  Stop Time: 1057  Time Calculation (min): 49 min  Therapy Charges for Today     Code Description Service Date Service Provider Modifiers Qty    99162757904  PT TRACTION CERVICAL 10/15/2020 Nikhil Lebron, PT GP 1                    Nikhil Lebron, YADIEL  10/15/2020     "

## 2020-10-27 DIAGNOSIS — F33.1 MODERATE EPISODE OF RECURRENT MAJOR DEPRESSIVE DISORDER (HCC): ICD-10-CM

## 2020-10-27 DIAGNOSIS — M54.42 CHRONIC BILATERAL LOW BACK PAIN WITH BILATERAL SCIATICA: ICD-10-CM

## 2020-10-27 DIAGNOSIS — R60.0 LOWER EXTREMITY EDEMA: ICD-10-CM

## 2020-10-27 DIAGNOSIS — G89.29 CHRONIC BILATERAL LOW BACK PAIN WITH BILATERAL SCIATICA: ICD-10-CM

## 2020-10-27 DIAGNOSIS — S46.811A TRAPEZIUS MUSCLE STRAIN, RIGHT, INITIAL ENCOUNTER: ICD-10-CM

## 2020-10-27 DIAGNOSIS — M54.41 CHRONIC BILATERAL LOW BACK PAIN WITH BILATERAL SCIATICA: ICD-10-CM

## 2020-10-27 RX ORDER — FLUOXETINE HYDROCHLORIDE 20 MG/1
20 CAPSULE ORAL DAILY
Qty: 30 CAPSULE | Refills: 1 | Status: SHIPPED | OUTPATIENT
Start: 2020-10-27 | End: 2020-11-30

## 2020-10-27 RX ORDER — CYCLOBENZAPRINE HCL 10 MG
TABLET ORAL
Qty: 30 TABLET | Refills: 1 | Status: SHIPPED | OUTPATIENT
Start: 2020-10-27 | End: 2020-11-13 | Stop reason: SDUPTHER

## 2020-10-27 RX ORDER — POTASSIUM CHLORIDE 750 MG/1
10 TABLET, FILM COATED, EXTENDED RELEASE ORAL DAILY
Qty: 30 TABLET | Refills: 1 | Status: SHIPPED | OUTPATIENT
Start: 2020-10-27 | End: 2021-05-13 | Stop reason: SDUPTHER

## 2020-10-27 RX ORDER — FUROSEMIDE 20 MG/1
20 TABLET ORAL DAILY
Qty: 30 TABLET | Refills: 1 | Status: SHIPPED | OUTPATIENT
Start: 2020-10-27 | End: 2020-12-23

## 2020-11-13 ENCOUNTER — OFFICE VISIT (OUTPATIENT)
Dept: ORTHOPEDIC SURGERY | Facility: CLINIC | Age: 60
End: 2020-11-13

## 2020-11-13 VITALS — HEIGHT: 69 IN | WEIGHT: 237 LBS | BODY MASS INDEX: 35.1 KG/M2

## 2020-11-13 DIAGNOSIS — M54.12 RADICULOPATHY OF CERVICAL SPINE: ICD-10-CM

## 2020-11-13 DIAGNOSIS — S46.811A TRAPEZIUS MUSCLE STRAIN, RIGHT, INITIAL ENCOUNTER: ICD-10-CM

## 2020-11-13 DIAGNOSIS — G89.29 CHRONIC BILATERAL LOW BACK PAIN WITH BILATERAL SCIATICA: ICD-10-CM

## 2020-11-13 DIAGNOSIS — M54.42 CHRONIC BILATERAL LOW BACK PAIN WITH BILATERAL SCIATICA: ICD-10-CM

## 2020-11-13 DIAGNOSIS — M47.812 CERVICAL SPONDYLOSIS: Primary | ICD-10-CM

## 2020-11-13 DIAGNOSIS — M54.41 CHRONIC BILATERAL LOW BACK PAIN WITH BILATERAL SCIATICA: ICD-10-CM

## 2020-11-13 PROCEDURE — 99214 OFFICE O/P EST MOD 30 MIN: CPT | Performed by: NURSE PRACTITIONER

## 2020-11-13 RX ORDER — CYCLOBENZAPRINE HCL 10 MG
10 TABLET ORAL 3 TIMES DAILY PRN
Qty: 30 TABLET | Refills: 1 | Status: SHIPPED | OUTPATIENT
Start: 2020-11-13 | End: 2020-12-23

## 2020-11-13 NOTE — PROGRESS NOTES
Subjective:     Patient ID: Jaye Rivas is a 60 y.o. female.    Chief Complaint:  Follow-up cervical radiculopathy   History of Present Illness  Jaye Rivas returns to clinic for follow-up. Continues to experience pain cervical spine, left side.  Increased pain noted at night and with sitting lateral motion.  Has continued anti-inflammatories has continued muscle relaxers is also completed physical therapy without any significant symptom improvement.  Continues to experience numbness and tingling as well as pain radiating down the bilateral upper extremities however left greater than right at today's visit.  Denies other concerns present time.       Social History     Occupational History   • Not on file   Tobacco Use   • Smoking status: Never Smoker   • Smokeless tobacco: Never Used   • Tobacco comment: daily caffiene   Substance and Sexual Activity   • Alcohol use: No     Frequency: Never   • Drug use: No   • Sexual activity: Defer      Past Medical History:   Diagnosis Date   • Cervical spondylosis 6/15/2020   • Chronic back pain    • Depression    • History of sleep apnea    • Lumbar spondylosis 8/15/2018   • Migraine    • Mixed hyperlipidemia 1/13/2020 1/13/20: .  Family history of heart disease, father.  Start Lipitor 10 mg daily.  6/8/2020: Controlled with Lipitor 10 mg daily. No medication side effects. Continue current treatment.     Past Surgical History:   Procedure Laterality Date   • BACK SURGERY     • DILATATION AND CURETTAGE     • EYE SURGERY         Family History   Problem Relation Age of Onset   • No Known Problems Mother    • Heart failure Father    • Breast cancer Maternal Aunt    • Breast cancer Maternal Grandmother          Review of Systems   Constitutional: Negative for chills, diaphoresis, fever and unexpected weight change.   HENT: Negative for hearing loss, nosebleeds, sore throat and tinnitus.    Eyes: Negative for pain and visual disturbance.   Respiratory: Negative for cough,  "shortness of breath and wheezing.    Cardiovascular: Negative for chest pain and palpitations.   Gastrointestinal: Negative for abdominal pain, diarrhea, nausea and vomiting.   Endocrine: Negative for cold intolerance, heat intolerance and polydipsia.   Genitourinary: Negative for difficulty urinating, dysuria and hematuria.   Musculoskeletal: Positive for arthralgias and myalgias. Negative for joint swelling.   Skin: Negative for rash and wound.   Allergic/Immunologic: Negative for environmental allergies.   Neurological: Negative for dizziness, syncope and numbness.   Hematological: Does not bruise/bleed easily.   Psychiatric/Behavioral: Negative for dysphoric mood and sleep disturbance. The patient is not nervous/anxious.            Objective:  Physical Exam    General: No acute distress.  Eyes: conjunctiva clear; pupils equally round and reactive  ENT: external ears and nose atraumatic; oropharynx clear  CV: no peripheral edema  Resp: normal respiratory effort  Skin: no rashes or wounds; normal turgor  Psych: mood and affect appropriate; recent and remote memory intact    Vitals:    11/13/20 1427   Weight: 108 kg (237 lb)   Height: 175.3 cm (69\")         11/13/20  1427   Weight: 108 kg (237 lb)     Body mass index is 35 kg/m².      Back Exam     Tenderness   The patient is experiencing tenderness in the cervical.    Range of Motion   Extension: 60   Flexion: 70   Lateral bend right: abnormal   Lateral bend left: abnormal   Rotation right: normal   Rotation left: abnormal     Other   Sensation: normal    Comments:  Negative Spurling's exam                  Imaging:  MRI Spine Cervical WO     INDICATION:    4 month history of neck pain with bilateral upper extremity numbness and tingling     TECHNIQUE:   MRI of the cervical spine without IV contrast.     COMPARISON:    No pertinent prior study     FINDINGS:  The sagittal alignment shows reversal of the normal cervical lordosis. Vertebral body heights are " maintained. Vertebral bodies demonstrate normal signal intensity. The visualized portions of the intracranial contents are within normal limits. The  cervicomedullary junction is normal. The spinal cord is of normal course and caliber. No syrinx is seen. No clearly reproducible areas of signal abnormality identified.     At C2-C3, disc desiccation and minimal loss of disc height. Mild facet arthropathy. No spinal canal or neural foraminal compromise.     At C3-C4, disc desiccation and minimal loss of disc height. Mild effacement of the ventral thecal sac. No cord flattening. Mild facet arthropathy. No neural foraminal compromise.     At C4-C5, broad-based central disc herniation with effacement of the ventral thecal sac and mild cord flattening. Bilateral facet arthropathy. Mild narrowing of the neural foramina bilaterally.     At C5-C6, disc desiccation and moderate loss of disc height. Broad-based central disc osteophyte complex with effacement of the ventral thecal sac but no definite cord flattening. Facet arthropathy and mild narrowing of the neural foramina bilaterally.     At C6-C7, disc desiccation and mild loss of disc height. Broad-based central disc osteophyte complex with effacement of the ventral thecal sac and mild cord flattening. Mild narrowing of the neural foramina bilaterally. Mild facet arthropathy.     At C7-T1, no significant disc degeneration. Minimal facet arthropathy. No spinal canal or neural foraminal compromise.     Paravertebral soft tissues demonstrate no acute findings.     IMPRESSION:  Cervical spondylosis as described.     Please see the above report for complete description of the individual levels.     Signer Name: Nando Hope MD   Signed: 6/12/2020 12:00 PM   Workstation Name: [id number]    [facility]    Assessment:        1. Cervical spondylosis    2. Radiculopathy of cervical spine           Plan:  1. Discussed plan of care with patient. Will proceed  with referral for pain management to discuss cervical spine epidural injections. Will plan to see her back in clinic only as needed. Patient verbalized understanding of all information and agrees with plan of care. Denies all other concerns present at this time.     Orders:  Orders Placed This Encounter   Procedures   • Ambulatory Referral to Pain Management       Medications:  No orders of the defined types were placed in this encounter.      Followup:  No follow-ups on file.    Diagnoses and all orders for this visit:    1. Cervical spondylosis (Primary)  -     Ambulatory Referral to Pain Management    2. Radiculopathy of cervical spine  -     Ambulatory Referral to Pain Management      Dictated utilizing Dragon dictation

## 2020-11-24 DIAGNOSIS — E78.2 MIXED HYPERLIPIDEMIA: ICD-10-CM

## 2020-11-24 RX ORDER — ATORVASTATIN CALCIUM 10 MG/1
10 TABLET, FILM COATED ORAL DAILY
Qty: 30 TABLET | Refills: 10 | OUTPATIENT
Start: 2020-11-24

## 2020-11-30 ENCOUNTER — OFFICE VISIT (OUTPATIENT)
Dept: FAMILY MEDICINE CLINIC | Facility: CLINIC | Age: 60
End: 2020-11-30

## 2020-11-30 VITALS
SYSTOLIC BLOOD PRESSURE: 102 MMHG | DIASTOLIC BLOOD PRESSURE: 62 MMHG | HEIGHT: 69 IN | OXYGEN SATURATION: 95 % | RESPIRATION RATE: 16 BRPM | BODY MASS INDEX: 36.14 KG/M2 | WEIGHT: 244 LBS | HEART RATE: 85 BPM

## 2020-11-30 DIAGNOSIS — G25.81 RLS (RESTLESS LEGS SYNDROME): Primary | ICD-10-CM

## 2020-11-30 DIAGNOSIS — Z23 NEED FOR VACCINATION: ICD-10-CM

## 2020-11-30 DIAGNOSIS — F33.1 MODERATE EPISODE OF RECURRENT MAJOR DEPRESSIVE DISORDER (HCC): ICD-10-CM

## 2020-11-30 DIAGNOSIS — Z00.00 ENCOUNTER FOR WELL ADULT EXAM WITHOUT ABNORMAL FINDINGS: ICD-10-CM

## 2020-11-30 DIAGNOSIS — Z79.899 HIGH RISK MEDICATION USE: ICD-10-CM

## 2020-11-30 PROCEDURE — 90471 IMMUNIZATION ADMIN: CPT | Performed by: FAMILY MEDICINE

## 2020-11-30 PROCEDURE — 90686 IIV4 VACC NO PRSV 0.5 ML IM: CPT | Performed by: FAMILY MEDICINE

## 2020-11-30 PROCEDURE — 99214 OFFICE O/P EST MOD 30 MIN: CPT | Performed by: FAMILY MEDICINE

## 2020-11-30 RX ORDER — FLUOXETINE HYDROCHLORIDE 40 MG/1
40 CAPSULE ORAL DAILY
Qty: 90 CAPSULE | Refills: 1 | Status: SHIPPED | OUTPATIENT
Start: 2020-11-30 | End: 2021-01-19 | Stop reason: SDUPTHER

## 2020-11-30 RX ORDER — ROPINIROLE 0.5 MG/1
0.5 TABLET, FILM COATED ORAL NIGHTLY
Qty: 90 TABLET | Refills: 1 | Status: SHIPPED | OUTPATIENT
Start: 2020-11-30 | End: 2021-05-20 | Stop reason: SDUPTHER

## 2020-12-03 LAB
ALBUMIN SERPL-MCNC: 4.4 G/DL (ref 3.5–5.2)
ALBUMIN/GLOB SERPL: 2 G/DL
ALP SERPL-CCNC: 77 U/L (ref 39–117)
ALT SERPL-CCNC: 21 U/L (ref 1–33)
APPEARANCE UR: ABNORMAL
AST SERPL-CCNC: 16 U/L (ref 1–32)
BACTERIA #/AREA URNS HPF: NORMAL /HPF
BACTERIA UR CULT: NORMAL
BACTERIA UR CULT: NORMAL
BASOPHILS # BLD AUTO: 0.06 10*3/MM3 (ref 0–0.2)
BASOPHILS NFR BLD AUTO: 0.8 % (ref 0–1.5)
BILIRUB SERPL-MCNC: 0.5 MG/DL (ref 0–1.2)
BILIRUB UR QL STRIP: NEGATIVE
BUN SERPL-MCNC: 19 MG/DL (ref 6–20)
BUN/CREAT SERPL: 24.4 (ref 7–25)
CALCIUM SERPL-MCNC: 9.4 MG/DL (ref 8.6–10.5)
CHLORIDE SERPL-SCNC: 101 MMOL/L (ref 98–107)
CHOLEST SERPL-MCNC: 219 MG/DL (ref 0–200)
CHOLEST/HDLC SERPL: 2.96 {RATIO}
CO2 SERPL-SCNC: 28.7 MMOL/L (ref 22–29)
COLOR UR: YELLOW
CREAT SERPL-MCNC: 0.78 MG/DL (ref 0.57–1)
EOSINOPHIL # BLD AUTO: 0.09 10*3/MM3 (ref 0–0.4)
EOSINOPHIL NFR BLD AUTO: 1.2 % (ref 0.3–6.2)
EPI CELLS #/AREA URNS HPF: NORMAL /HPF (ref 0–10)
ERYTHROCYTE [DISTWIDTH] IN BLOOD BY AUTOMATED COUNT: 12.5 % (ref 12.3–15.4)
GLOBULIN SER CALC-MCNC: 2.2 GM/DL
GLUCOSE SERPL-MCNC: 84 MG/DL (ref 65–99)
GLUCOSE UR QL: NEGATIVE
HCT VFR BLD AUTO: 46.4 % (ref 34–46.6)
HDLC SERPL-MCNC: 74 MG/DL (ref 40–60)
HGB BLD-MCNC: 15.7 G/DL (ref 12–15.9)
HGB UR QL STRIP: NEGATIVE
IMM GRANULOCYTES # BLD AUTO: 0.03 10*3/MM3 (ref 0–0.05)
IMM GRANULOCYTES NFR BLD AUTO: 0.4 % (ref 0–0.5)
KETONES UR QL STRIP: NEGATIVE
LDLC SERPL CALC-MCNC: 123 MG/DL (ref 0–100)
LEUKOCYTE ESTERASE UR QL STRIP: ABNORMAL
LYMPHOCYTES # BLD AUTO: 1.52 10*3/MM3 (ref 0.7–3.1)
LYMPHOCYTES NFR BLD AUTO: 19.5 % (ref 19.6–45.3)
MCH RBC QN AUTO: 31.3 PG (ref 26.6–33)
MCHC RBC AUTO-ENTMCNC: 33.8 G/DL (ref 31.5–35.7)
MCV RBC AUTO: 92.4 FL (ref 79–97)
MICRO URNS: ABNORMAL
MONOCYTES # BLD AUTO: 0.41 10*3/MM3 (ref 0.1–0.9)
MONOCYTES NFR BLD AUTO: 5.2 % (ref 5–12)
NEUTROPHILS # BLD AUTO: 5.7 10*3/MM3 (ref 1.7–7)
NEUTROPHILS NFR BLD AUTO: 72.9 % (ref 42.7–76)
NITRITE UR QL STRIP: NEGATIVE
NRBC BLD AUTO-RTO: 0 /100 WBC (ref 0–0.2)
PH UR STRIP: 5.5 [PH] (ref 5–7.5)
PLATELET # BLD AUTO: 299 10*3/MM3 (ref 140–450)
POTASSIUM SERPL-SCNC: 4.7 MMOL/L (ref 3.5–5.2)
PROT SERPL-MCNC: 6.6 G/DL (ref 6–8.5)
PROT UR QL STRIP: NEGATIVE
RBC # BLD AUTO: 5.02 10*6/MM3 (ref 3.77–5.28)
RBC #/AREA URNS HPF: NORMAL /HPF (ref 0–2)
SODIUM SERPL-SCNC: 139 MMOL/L (ref 136–145)
SP GR UR: 1.02 (ref 1–1.03)
TRIGL SERPL-MCNC: 124 MG/DL (ref 0–150)
TSH SERPL DL<=0.005 MIU/L-ACNC: 0.89 UIU/ML (ref 0.27–4.2)
URINALYSIS REFLEX: ABNORMAL
UROBILINOGEN UR STRIP-MCNC: 0.2 MG/DL (ref 0.2–1)
VLDLC SERPL CALC-MCNC: 22 MG/DL (ref 5–40)
WBC # BLD AUTO: 7.81 10*3/MM3 (ref 3.4–10.8)
WBC #/AREA URNS HPF: NORMAL /HPF (ref 0–5)

## 2020-12-04 DIAGNOSIS — E78.2 MIXED HYPERLIPIDEMIA: ICD-10-CM

## 2020-12-04 RX ORDER — ATORVASTATIN CALCIUM 10 MG/1
10 TABLET, FILM COATED ORAL DAILY
Qty: 30 TABLET | Refills: 5 | Status: SHIPPED | OUTPATIENT
Start: 2020-12-04 | End: 2021-05-13 | Stop reason: SDUPTHER

## 2020-12-22 DIAGNOSIS — R60.0 LOWER EXTREMITY EDEMA: ICD-10-CM

## 2020-12-22 DIAGNOSIS — M54.42 CHRONIC BILATERAL LOW BACK PAIN WITH BILATERAL SCIATICA: ICD-10-CM

## 2020-12-22 DIAGNOSIS — G89.29 CHRONIC BILATERAL LOW BACK PAIN WITH BILATERAL SCIATICA: ICD-10-CM

## 2020-12-22 DIAGNOSIS — S46.811A TRAPEZIUS MUSCLE STRAIN, RIGHT, INITIAL ENCOUNTER: ICD-10-CM

## 2020-12-22 DIAGNOSIS — M54.41 CHRONIC BILATERAL LOW BACK PAIN WITH BILATERAL SCIATICA: ICD-10-CM

## 2020-12-23 RX ORDER — FUROSEMIDE 20 MG/1
20 TABLET ORAL DAILY
Qty: 30 TABLET | Refills: 1 | Status: SHIPPED | OUTPATIENT
Start: 2020-12-23 | End: 2021-02-19

## 2020-12-23 RX ORDER — CYCLOBENZAPRINE HCL 10 MG
TABLET ORAL
Qty: 30 TABLET | Refills: 1 | Status: SHIPPED | OUTPATIENT
Start: 2020-12-23 | End: 2021-02-19

## 2021-01-19 DIAGNOSIS — F33.1 MODERATE EPISODE OF RECURRENT MAJOR DEPRESSIVE DISORDER (HCC): ICD-10-CM

## 2021-01-19 RX ORDER — FLUOXETINE HYDROCHLORIDE 20 MG/1
20 CAPSULE ORAL DAILY
Qty: 90 CAPSULE | Refills: 0 | Status: SHIPPED | OUTPATIENT
Start: 2021-01-19 | End: 2021-05-20 | Stop reason: SDUPTHER

## 2021-02-18 DIAGNOSIS — S46.811A TRAPEZIUS MUSCLE STRAIN, RIGHT, INITIAL ENCOUNTER: ICD-10-CM

## 2021-02-18 DIAGNOSIS — G89.29 CHRONIC BILATERAL LOW BACK PAIN WITH BILATERAL SCIATICA: ICD-10-CM

## 2021-02-18 DIAGNOSIS — M54.42 CHRONIC BILATERAL LOW BACK PAIN WITH BILATERAL SCIATICA: ICD-10-CM

## 2021-02-18 DIAGNOSIS — M54.41 CHRONIC BILATERAL LOW BACK PAIN WITH BILATERAL SCIATICA: ICD-10-CM

## 2021-02-18 DIAGNOSIS — R60.0 LOWER EXTREMITY EDEMA: ICD-10-CM

## 2021-02-19 RX ORDER — FUROSEMIDE 20 MG/1
TABLET ORAL
Qty: 30 TABLET | Refills: 1 | Status: SHIPPED | OUTPATIENT
Start: 2021-02-19 | End: 2021-04-20

## 2021-02-19 RX ORDER — CYCLOBENZAPRINE HCL 10 MG
TABLET ORAL
Qty: 30 TABLET | Refills: 1 | Status: SHIPPED | OUTPATIENT
Start: 2021-02-19 | End: 2021-04-20

## 2021-04-16 DIAGNOSIS — M54.42 CHRONIC BILATERAL LOW BACK PAIN WITH BILATERAL SCIATICA: ICD-10-CM

## 2021-04-16 DIAGNOSIS — G89.29 CHRONIC BILATERAL LOW BACK PAIN WITH BILATERAL SCIATICA: ICD-10-CM

## 2021-04-16 DIAGNOSIS — R60.0 LOWER EXTREMITY EDEMA: ICD-10-CM

## 2021-04-16 DIAGNOSIS — M54.41 CHRONIC BILATERAL LOW BACK PAIN WITH BILATERAL SCIATICA: ICD-10-CM

## 2021-04-16 DIAGNOSIS — S46.811A TRAPEZIUS MUSCLE STRAIN, RIGHT, INITIAL ENCOUNTER: ICD-10-CM

## 2021-04-20 RX ORDER — CYCLOBENZAPRINE HCL 10 MG
TABLET ORAL
Qty: 30 TABLET | Refills: 1 | Status: SHIPPED | OUTPATIENT
Start: 2021-04-20 | End: 2021-06-02

## 2021-04-20 RX ORDER — FUROSEMIDE 20 MG/1
TABLET ORAL
Qty: 30 TABLET | Refills: 1 | Status: SHIPPED | OUTPATIENT
Start: 2021-04-20 | End: 2021-06-02 | Stop reason: SDUPTHER

## 2021-05-09 VITALS
WEIGHT: 255 LBS | DIASTOLIC BLOOD PRESSURE: 70 MMHG | HEIGHT: 69 IN | TEMPERATURE: 97 F | SYSTOLIC BLOOD PRESSURE: 110 MMHG | HEART RATE: 110 BPM | BODY MASS INDEX: 37.77 KG/M2 | OXYGEN SATURATION: 96 %

## 2021-05-10 DIAGNOSIS — E78.2 MIXED HYPERLIPIDEMIA: ICD-10-CM

## 2021-05-10 DIAGNOSIS — R60.0 LOWER EXTREMITY EDEMA: ICD-10-CM

## 2021-05-12 RX ORDER — POTASSIUM CHLORIDE 750 MG/1
10 TABLET, FILM COATED, EXTENDED RELEASE ORAL DAILY
Qty: 90 TABLET | Refills: 1 | OUTPATIENT
Start: 2021-05-12

## 2021-05-12 RX ORDER — ATORVASTATIN CALCIUM 10 MG/1
10 TABLET, FILM COATED ORAL DAILY
Qty: 90 TABLET | Refills: 1 | OUTPATIENT
Start: 2021-05-12

## 2021-05-13 RX ORDER — POTASSIUM CHLORIDE 750 MG/1
10 TABLET, FILM COATED, EXTENDED RELEASE ORAL DAILY
Qty: 30 TABLET | Refills: 0 | Status: SHIPPED | OUTPATIENT
Start: 2021-05-13 | End: 2021-06-02 | Stop reason: SDUPTHER

## 2021-05-13 RX ORDER — ATORVASTATIN CALCIUM 10 MG/1
10 TABLET, FILM COATED ORAL DAILY
Qty: 30 TABLET | Refills: 0 | Status: SHIPPED | OUTPATIENT
Start: 2021-05-13 | End: 2021-05-20 | Stop reason: SDUPTHER

## 2021-05-17 DIAGNOSIS — F33.1 MODERATE EPISODE OF RECURRENT MAJOR DEPRESSIVE DISORDER (HCC): ICD-10-CM

## 2021-05-18 RX ORDER — FLUOXETINE HYDROCHLORIDE 20 MG/1
20 CAPSULE ORAL DAILY
Qty: 30 CAPSULE | Refills: 1 | OUTPATIENT
Start: 2021-05-18

## 2021-05-18 NOTE — TELEPHONE ENCOUNTER
Detailed vmaill left per verbal agreement. Advised pt to call back and set up appointment in order to have a refill.

## 2021-05-20 ENCOUNTER — TELEPHONE (OUTPATIENT)
Dept: FAMILY MEDICINE CLINIC | Facility: CLINIC | Age: 61
End: 2021-05-20

## 2021-05-20 DIAGNOSIS — G25.81 RLS (RESTLESS LEGS SYNDROME): ICD-10-CM

## 2021-05-20 DIAGNOSIS — F33.1 MODERATE EPISODE OF RECURRENT MAJOR DEPRESSIVE DISORDER: ICD-10-CM

## 2021-05-20 DIAGNOSIS — E78.2 MIXED HYPERLIPIDEMIA: ICD-10-CM

## 2021-05-20 RX ORDER — FLUOXETINE HYDROCHLORIDE 20 MG/1
20 CAPSULE ORAL DAILY
Qty: 30 CAPSULE | Refills: 0 | Status: SHIPPED | OUTPATIENT
Start: 2021-05-20 | End: 2021-06-02 | Stop reason: SDUPTHER

## 2021-05-20 RX ORDER — ATORVASTATIN CALCIUM 10 MG/1
10 TABLET, FILM COATED ORAL DAILY
Qty: 30 TABLET | Refills: 0 | Status: SHIPPED | OUTPATIENT
Start: 2021-05-20 | End: 2021-06-02 | Stop reason: SDUPTHER

## 2021-05-20 RX ORDER — ROPINIROLE 0.5 MG/1
0.5 TABLET, FILM COATED ORAL NIGHTLY
Qty: 30 TABLET | Refills: 0 | Status: SHIPPED | OUTPATIENT
Start: 2021-05-20 | End: 2021-06-02 | Stop reason: SDUPTHER

## 2021-05-20 NOTE — TELEPHONE ENCOUNTER
Pleaseletherknow I sent prescriptions for fluoxetine and Requip to her pharmacy, for 30-day supply, to last her until her upcoming appointment.

## 2021-05-20 NOTE — TELEPHONE ENCOUNTER
LOV --11/30/2020    NOV-- 5/25/2021 (LABS) and 6/2/2021 (OV)      Pt is requesting refills. Pls advise.

## 2021-05-20 NOTE — TELEPHONE ENCOUNTER
Caller: [facility], [place] - [address] [facility] - [phone] [facility] [phone] FX    Relationship: Pharmacy    Best call back number: [phone]    Medication needed:   Requested Prescriptions     Pending Prescriptions Disp Refills   • FLUoxetine (PROzac) 20 MG capsule 90 capsule 0     Sig: Take 1 capsule by mouth Daily.   • rOPINIRole (REQUIP) 0.5 MG tablet 90 tablet 1     Sig: Take 1 tablet by mouth Every Night. Take 1 hour before bedtime.       When do you need the refill by: ASAP    What additional details did the patient provide when requesting the medication: PATIENT HAS APPT SCHEDULED FOR 6/2    Does the patient have less than a 3 day supply:  [x] Yes  [] No    What is the patient's preferred pharmacy: [facility], [place] - [address] [facility] - [phone] [facility] [phone] FX<br>[address] [phone] [facility] [phone] FX

## 2021-05-25 ENCOUNTER — LAB (OUTPATIENT)
Dept: FAMILY MEDICINE CLINIC | Facility: CLINIC | Age: 61
End: 2021-05-25

## 2021-05-25 DIAGNOSIS — Z79.899 HIGH RISK MEDICATION USE: ICD-10-CM

## 2021-05-26 LAB
ALBUMIN SERPL-MCNC: 4.4 G/DL (ref 3.8–4.9)
ALBUMIN/GLOB SERPL: 1.7 {RATIO} (ref 1.2–2.2)
ALP SERPL-CCNC: 72 IU/L (ref 48–121)
ALT SERPL-CCNC: 18 IU/L (ref 0–32)
AST SERPL-CCNC: 22 IU/L (ref 0–40)
BASOPHILS # BLD AUTO: 0 X10E3/UL (ref 0–0.2)
BASOPHILS NFR BLD AUTO: 1 %
BILIRUB SERPL-MCNC: 0.6 MG/DL (ref 0–1.2)
BUN SERPL-MCNC: 10 MG/DL (ref 6–24)
BUN/CREAT SERPL: 11 (ref 9–23)
CALCIUM SERPL-MCNC: 9.3 MG/DL (ref 8.7–10.2)
CHLORIDE SERPL-SCNC: 104 MMOL/L (ref 96–106)
CO2 SERPL-SCNC: 24 MMOL/L (ref 20–29)
CREAT SERPL-MCNC: 0.87 MG/DL (ref 0.57–1)
EOSINOPHIL # BLD AUTO: 0.1 X10E3/UL (ref 0–0.4)
EOSINOPHIL NFR BLD AUTO: 2 %
ERYTHROCYTE [DISTWIDTH] IN BLOOD BY AUTOMATED COUNT: 12.7 % (ref 11.7–15.4)
GLOBULIN SER CALC-MCNC: 2.6 G/DL (ref 1.5–4.5)
GLUCOSE SERPL-MCNC: 93 MG/DL (ref 65–99)
HCT VFR BLD AUTO: 43.6 % (ref 34–46.6)
HGB BLD-MCNC: 14.6 G/DL (ref 11.1–15.9)
IMM GRANULOCYTES # BLD AUTO: 0 X10E3/UL (ref 0–0.1)
IMM GRANULOCYTES NFR BLD AUTO: 0 %
LYMPHOCYTES # BLD AUTO: 1.3 X10E3/UL (ref 0.7–3.1)
LYMPHOCYTES NFR BLD AUTO: 24 %
MCH RBC QN AUTO: 31.9 PG (ref 26.6–33)
MCHC RBC AUTO-ENTMCNC: 33.5 G/DL (ref 31.5–35.7)
MCV RBC AUTO: 95 FL (ref 79–97)
MONOCYTES # BLD AUTO: 0.3 X10E3/UL (ref 0.1–0.9)
MONOCYTES NFR BLD AUTO: 6 %
NEUTROPHILS # BLD AUTO: 3.6 X10E3/UL (ref 1.4–7)
NEUTROPHILS NFR BLD AUTO: 67 %
PLATELET # BLD AUTO: 271 X10E3/UL (ref 150–450)
POTASSIUM SERPL-SCNC: 4.7 MMOL/L (ref 3.5–5.2)
PROT SERPL-MCNC: 7 G/DL (ref 6–8.5)
RBC # BLD AUTO: 4.57 X10E6/UL (ref 3.77–5.28)
SODIUM SERPL-SCNC: 143 MMOL/L (ref 134–144)
WBC # BLD AUTO: 5.4 X10E3/UL (ref 3.4–10.8)

## 2021-06-02 ENCOUNTER — OFFICE VISIT (OUTPATIENT)
Dept: FAMILY MEDICINE CLINIC | Facility: CLINIC | Age: 61
End: 2021-06-02

## 2021-06-02 VITALS
WEIGHT: 246 LBS | HEIGHT: 69 IN | HEART RATE: 93 BPM | BODY MASS INDEX: 36.43 KG/M2 | OXYGEN SATURATION: 95 % | DIASTOLIC BLOOD PRESSURE: 74 MMHG | SYSTOLIC BLOOD PRESSURE: 116 MMHG

## 2021-06-02 DIAGNOSIS — G89.29 CHRONIC BILATERAL LOW BACK PAIN WITH BILATERAL SCIATICA: ICD-10-CM

## 2021-06-02 DIAGNOSIS — E78.2 MIXED HYPERLIPIDEMIA: ICD-10-CM

## 2021-06-02 DIAGNOSIS — M54.42 CHRONIC BILATERAL LOW BACK PAIN WITH BILATERAL SCIATICA: ICD-10-CM

## 2021-06-02 DIAGNOSIS — Z79.899 HIGH RISK MEDICATION USE: ICD-10-CM

## 2021-06-02 DIAGNOSIS — S46.811A TRAPEZIUS MUSCLE STRAIN, RIGHT, INITIAL ENCOUNTER: ICD-10-CM

## 2021-06-02 DIAGNOSIS — M54.41 CHRONIC BILATERAL LOW BACK PAIN WITH BILATERAL SCIATICA: ICD-10-CM

## 2021-06-02 DIAGNOSIS — F33.1 MODERATE EPISODE OF RECURRENT MAJOR DEPRESSIVE DISORDER (HCC): ICD-10-CM

## 2021-06-02 DIAGNOSIS — Z00.01 ENCOUNTER FOR WELL ADULT EXAM WITH ABNORMAL FINDINGS: ICD-10-CM

## 2021-06-02 DIAGNOSIS — G25.81 RLS (RESTLESS LEGS SYNDROME): ICD-10-CM

## 2021-06-02 DIAGNOSIS — I73.9 INTERMITTENT CLAUDICATION (HCC): Primary | ICD-10-CM

## 2021-06-02 DIAGNOSIS — R60.0 LOWER EXTREMITY EDEMA: ICD-10-CM

## 2021-06-02 PROCEDURE — 99214 OFFICE O/P EST MOD 30 MIN: CPT | Performed by: FAMILY MEDICINE

## 2021-06-02 RX ORDER — FLUOXETINE HYDROCHLORIDE 20 MG/1
20 CAPSULE ORAL DAILY
Qty: 90 CAPSULE | Refills: 1 | Status: SHIPPED | OUTPATIENT
Start: 2021-06-02 | End: 2021-06-17

## 2021-06-02 RX ORDER — FUROSEMIDE 20 MG/1
20 TABLET ORAL DAILY
Qty: 90 TABLET | Refills: 1 | Status: SHIPPED | OUTPATIENT
Start: 2021-06-02 | End: 2021-06-17

## 2021-06-02 RX ORDER — ATORVASTATIN CALCIUM 10 MG/1
10 TABLET, FILM COATED ORAL DAILY
Qty: 90 TABLET | Refills: 1 | Status: SHIPPED | OUTPATIENT
Start: 2021-06-02 | End: 2021-07-20

## 2021-06-02 RX ORDER — ROPINIROLE 0.5 MG/1
0.5 TABLET, FILM COATED ORAL NIGHTLY
Qty: 90 TABLET | Refills: 1 | Status: SHIPPED | OUTPATIENT
Start: 2021-06-02 | End: 2021-06-17

## 2021-06-02 RX ORDER — POTASSIUM CHLORIDE 750 MG/1
10 TABLET, FILM COATED, EXTENDED RELEASE ORAL DAILY
Qty: 90 TABLET | Refills: 1 | Status: SHIPPED | OUTPATIENT
Start: 2021-06-02 | End: 2021-06-17
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
## 2021-06-17 DIAGNOSIS — G25.81 RLS (RESTLESS LEGS SYNDROME): ICD-10-CM

## 2021-06-17 DIAGNOSIS — F33.1 MODERATE EPISODE OF RECURRENT MAJOR DEPRESSIVE DISORDER (HCC): ICD-10-CM

## 2021-06-17 DIAGNOSIS — R60.0 LOWER EXTREMITY EDEMA: ICD-10-CM

## 2021-06-17 RX ORDER — CYCLOBENZAPRINE HCL 10 MG
TABLET ORAL
Qty: 30 TABLET | Refills: 1 | Status: SHIPPED | OUTPATIENT
Start: 2021-06-17 | End: 2021-08-26

## 2021-06-17 RX ORDER — ROPINIROLE 0.5 MG/1
TABLET, FILM COATED ORAL
Qty: 90 TABLET | Refills: 1 | Status: SHIPPED | OUTPATIENT
Start: 2021-06-17 | End: 2022-01-17

## 2021-06-17 RX ORDER — FLUOXETINE HYDROCHLORIDE 20 MG/1
20 CAPSULE ORAL DAILY
Qty: 90 CAPSULE | Refills: 1 | Status: SHIPPED | OUTPATIENT
Start: 2021-06-17 | End: 2022-01-17

## 2021-06-17 RX ORDER — FUROSEMIDE 20 MG/1
TABLET ORAL
Qty: 90 TABLET | Refills: 1 | Status: SHIPPED | OUTPATIENT
Start: 2021-06-17 | End: 2022-01-17

## 2021-06-17 RX ORDER — POTASSIUM CHLORIDE 750 MG/1
10 TABLET, FILM COATED, EXTENDED RELEASE ORAL DAILY
Qty: 90 TABLET | Refills: 1 | Status: SHIPPED | OUTPATIENT
Start: 2021-06-17 | End: 2022-01-17

## 2021-07-20 DIAGNOSIS — E78.2 MIXED HYPERLIPIDEMIA: ICD-10-CM

## 2021-07-20 RX ORDER — ATORVASTATIN CALCIUM 10 MG/1
10 TABLET, FILM COATED ORAL DAILY
Qty: 90 TABLET | Refills: 1 | Status: SHIPPED | OUTPATIENT
Start: 2021-07-20 | End: 2022-01-17

## 2021-08-26 RX ORDER — CYCLOBENZAPRINE HCL 10 MG
TABLET ORAL
Qty: 30 TABLET | Refills: 1 | Status: SHIPPED | OUTPATIENT
Start: 2021-08-26 | End: 2021-10-20

## 2021-10-20 RX ORDER — CYCLOBENZAPRINE HCL 10 MG
TABLET ORAL
Qty: 30 TABLET | Refills: 1 | Status: SHIPPED | OUTPATIENT
Start: 2021-10-20

## 2021-12-15 DIAGNOSIS — R60.0 LOWER EXTREMITY EDEMA: ICD-10-CM

## 2021-12-15 DIAGNOSIS — G25.81 RLS (RESTLESS LEGS SYNDROME): ICD-10-CM

## 2021-12-15 DIAGNOSIS — F33.1 MODERATE EPISODE OF RECURRENT MAJOR DEPRESSIVE DISORDER (HCC): ICD-10-CM

## 2021-12-15 RX ORDER — FUROSEMIDE 20 MG/1
TABLET ORAL
Qty: 30 TABLET | Refills: 1 | OUTPATIENT
Start: 2021-12-15

## 2021-12-15 RX ORDER — POTASSIUM CHLORIDE 750 MG/1
10 TABLET, FILM COATED, EXTENDED RELEASE ORAL DAILY
Qty: 30 TABLET | Refills: 1 | OUTPATIENT
Start: 2021-12-15

## 2021-12-15 RX ORDER — CYCLOBENZAPRINE HCL 10 MG
TABLET ORAL
Qty: 30 TABLET | Refills: 1 | OUTPATIENT
Start: 2021-12-15

## 2021-12-15 RX ORDER — FLUOXETINE HYDROCHLORIDE 20 MG/1
20 CAPSULE ORAL DAILY
Qty: 30 CAPSULE | Refills: 1 | OUTPATIENT
Start: 2021-12-15

## 2021-12-15 RX ORDER — ROPINIROLE 0.5 MG/1
TABLET, FILM COATED ORAL
Qty: 30 TABLET | Refills: 1 | OUTPATIENT
Start: 2021-12-15

## 2021-12-23 DIAGNOSIS — G25.81 RLS (RESTLESS LEGS SYNDROME): ICD-10-CM

## 2021-12-23 DIAGNOSIS — F33.1 MODERATE EPISODE OF RECURRENT MAJOR DEPRESSIVE DISORDER (HCC): ICD-10-CM

## 2021-12-23 DIAGNOSIS — R60.0 LOWER EXTREMITY EDEMA: ICD-10-CM

## 2021-12-23 RX ORDER — ROPINIROLE 0.5 MG/1
TABLET, FILM COATED ORAL
Qty: 30 TABLET | Refills: 10 | OUTPATIENT
Start: 2021-12-23

## 2021-12-23 RX ORDER — FLUOXETINE HYDROCHLORIDE 20 MG/1
20 CAPSULE ORAL DAILY
Qty: 30 CAPSULE | Refills: 10 | OUTPATIENT
Start: 2021-12-23

## 2021-12-23 RX ORDER — FUROSEMIDE 20 MG/1
TABLET ORAL
Qty: 30 TABLET | Refills: 10 | OUTPATIENT
Start: 2021-12-23

## 2021-12-23 RX ORDER — POTASSIUM CHLORIDE 750 MG/1
10 TABLET, FILM COATED, EXTENDED RELEASE ORAL DAILY
Qty: 30 TABLET | Refills: 10 | OUTPATIENT
Start: 2021-12-23

## 2022-01-14 DIAGNOSIS — G25.81 RLS (RESTLESS LEGS SYNDROME): ICD-10-CM

## 2022-01-14 DIAGNOSIS — R60.0 LOWER EXTREMITY EDEMA: ICD-10-CM

## 2022-01-14 DIAGNOSIS — E78.2 MIXED HYPERLIPIDEMIA: ICD-10-CM

## 2022-01-14 DIAGNOSIS — F33.1 MODERATE EPISODE OF RECURRENT MAJOR DEPRESSIVE DISORDER: ICD-10-CM

## 2022-01-17 RX ORDER — POTASSIUM CHLORIDE 750 MG/1
10 TABLET, FILM COATED, EXTENDED RELEASE ORAL DAILY
Qty: 30 TABLET | Refills: 1 | Status: SHIPPED | OUTPATIENT
Start: 2022-01-17 | End: 2022-03-22

## 2022-01-17 RX ORDER — ROPINIROLE 0.5 MG/1
TABLET, FILM COATED ORAL
Qty: 30 TABLET | Refills: 0 | Status: SHIPPED | OUTPATIENT
Start: 2022-01-17 | End: 2022-02-25

## 2022-01-17 RX ORDER — FLUOXETINE HYDROCHLORIDE 20 MG/1
CAPSULE ORAL
Qty: 30 CAPSULE | Refills: 0 | Status: SHIPPED | OUTPATIENT
Start: 2022-01-17 | End: 2022-02-17

## 2022-01-17 RX ORDER — FUROSEMIDE 20 MG/1
TABLET ORAL
Qty: 30 TABLET | Refills: 1 | Status: SHIPPED | OUTPATIENT
Start: 2022-01-17 | End: 2022-03-22

## 2022-01-17 RX ORDER — ATORVASTATIN CALCIUM 10 MG/1
TABLET, FILM COATED ORAL
Qty: 30 TABLET | Refills: 1 | Status: SHIPPED | OUTPATIENT
Start: 2022-01-17 | End: 2022-03-22

## 2022-02-17 DIAGNOSIS — F33.1 MODERATE EPISODE OF RECURRENT MAJOR DEPRESSIVE DISORDER: ICD-10-CM

## 2022-02-17 RX ORDER — FLUOXETINE HYDROCHLORIDE 20 MG/1
CAPSULE ORAL
Qty: 30 CAPSULE | Refills: 0 | Status: SHIPPED | OUTPATIENT
Start: 2022-02-17 | End: 2022-03-22

## 2022-02-24 ENCOUNTER — TELEPHONE (OUTPATIENT)
Dept: FAMILY MEDICINE CLINIC | Facility: CLINIC | Age: 62
End: 2022-02-24

## 2022-02-24 DIAGNOSIS — G25.81 RLS (RESTLESS LEGS SYNDROME): ICD-10-CM

## 2022-02-25 RX ORDER — ROPINIROLE 0.5 MG/1
TABLET, FILM COATED ORAL
Qty: 30 TABLET | Refills: 10 | Status: SHIPPED | OUTPATIENT
Start: 2022-02-25

## 2022-03-22 DIAGNOSIS — R60.0 LOWER EXTREMITY EDEMA: ICD-10-CM

## 2022-03-22 DIAGNOSIS — E78.2 MIXED HYPERLIPIDEMIA: ICD-10-CM

## 2022-03-22 DIAGNOSIS — F33.1 MODERATE EPISODE OF RECURRENT MAJOR DEPRESSIVE DISORDER: ICD-10-CM

## 2022-03-22 RX ORDER — POTASSIUM CHLORIDE 750 MG/1
10 TABLET, FILM COATED, EXTENDED RELEASE ORAL DAILY
Qty: 15 TABLET | Refills: 0 | Status: SHIPPED | OUTPATIENT
Start: 2022-03-22

## 2022-03-22 RX ORDER — FLUOXETINE HYDROCHLORIDE 20 MG/1
CAPSULE ORAL
Qty: 15 CAPSULE | Refills: 0 | Status: SHIPPED | OUTPATIENT
Start: 2022-03-22

## 2022-03-22 RX ORDER — ATORVASTATIN CALCIUM 10 MG/1
TABLET, FILM COATED ORAL
Qty: 15 TABLET | Refills: 0 | Status: SHIPPED | OUTPATIENT
Start: 2022-03-22

## 2022-03-22 RX ORDER — FUROSEMIDE 20 MG/1
TABLET ORAL
Qty: 15 TABLET | Refills: 0 | Status: SHIPPED | OUTPATIENT
Start: 2022-03-22

## 2023-07-21 PROBLEM — D17.1 LIPOMA OF ABDOMINAL WALL: Status: ACTIVE | Noted: 2023-07-21

## 2023-07-21 PROBLEM — G89.29 CHRONIC NECK PAIN: Status: ACTIVE | Noted: 2023-07-21

## 2023-07-21 PROBLEM — G89.29 CHRONIC BACK PAIN: Status: ACTIVE | Noted: 2023-07-21

## 2023-07-21 PROBLEM — Z86.69 HISTORY OF SLEEP APNEA: Status: ACTIVE | Noted: 2023-07-21

## 2023-07-21 PROBLEM — G62.9 NEUROPATHY: Status: ACTIVE | Noted: 2023-07-21

## 2023-07-21 PROBLEM — M54.9 CHRONIC BACK PAIN: Status: ACTIVE | Noted: 2023-07-21

## 2023-07-21 PROBLEM — R20.2 PARESTHESIA: Status: ACTIVE | Noted: 2023-07-21

## 2023-07-21 PROBLEM — M54.2 CHRONIC NECK PAIN: Status: ACTIVE | Noted: 2023-07-21

## 2023-07-21 PROBLEM — G43.109 CLASSIC MIGRAINE: Status: ACTIVE | Noted: 2023-07-21

## 2023-07-21 PROBLEM — R51.9 CHRONIC DAILY HEADACHE: Status: ACTIVE | Noted: 2023-07-21

## 2023-07-21 PROCEDURE — 80061 LIPID PANEL: CPT | Performed by: FAMILY MEDICINE

## 2023-07-21 PROCEDURE — 85025 COMPLETE CBC W/AUTO DIFF WBC: CPT | Performed by: FAMILY MEDICINE

## 2023-07-21 PROCEDURE — 86803 HEPATITIS C AB TEST: CPT | Performed by: FAMILY MEDICINE

## 2023-07-21 PROCEDURE — 80053 COMPREHEN METABOLIC PANEL: CPT | Performed by: FAMILY MEDICINE

## 2023-08-07 ENCOUNTER — OFFICE VISIT (OUTPATIENT)
Dept: FAMILY MEDICINE CLINIC | Facility: CLINIC | Age: 63
End: 2023-08-07
Payer: MEDICARE

## 2023-08-07 VITALS
OXYGEN SATURATION: 94 % | SYSTOLIC BLOOD PRESSURE: 122 MMHG | WEIGHT: 254 LBS | DIASTOLIC BLOOD PRESSURE: 76 MMHG | HEART RATE: 82 BPM | HEIGHT: 69 IN | BODY MASS INDEX: 37.62 KG/M2 | TEMPERATURE: 97.1 F

## 2023-08-07 DIAGNOSIS — Z00.00 MEDICARE ANNUAL WELLNESS VISIT, SUBSEQUENT: Primary | ICD-10-CM

## 2023-08-07 DIAGNOSIS — F32.A ANXIETY AND DEPRESSION: ICD-10-CM

## 2023-08-07 DIAGNOSIS — E78.2 MIXED HYPERLIPIDEMIA: ICD-10-CM

## 2023-08-07 DIAGNOSIS — F41.9 ANXIETY AND DEPRESSION: ICD-10-CM

## 2023-08-07 PROCEDURE — 1159F MED LIST DOCD IN RCRD: CPT | Performed by: FAMILY MEDICINE

## 2023-08-07 PROCEDURE — 99213 OFFICE O/P EST LOW 20 MIN: CPT | Performed by: FAMILY MEDICINE

## 2023-08-07 PROCEDURE — G0439 PPPS, SUBSEQ VISIT: HCPCS | Performed by: FAMILY MEDICINE

## 2023-08-07 PROCEDURE — 1170F FXNL STATUS ASSESSED: CPT | Performed by: FAMILY MEDICINE

## 2023-08-07 PROCEDURE — 1125F AMNT PAIN NOTED PAIN PRSNT: CPT | Performed by: FAMILY MEDICINE

## 2023-08-07 NOTE — PROGRESS NOTES
The ABCs of the Annual Wellness Visit  Subsequent Medicare Wellness Visit    Subjective    Jaye Rivas is a 63 y.o. female who presents for a Subsequent Medicare Wellness Visit.    The following portions of the patient's history were reviewed and   updated as appropriate: She  has a past medical history of Cervical spondylosis (06/15/2020), History of sleep apnea, Lumbar spondylosis (08/15/2018), and Mixed hyperlipidemia (2020).  She does not have any pertinent problems on file.  She  has a past surgical history that includes Back surgery; Dilation and curettage of uterus; Eye surgery;  section; and Tubal ligation.  Her family history includes Alcohol abuse in her father; Breast cancer in her maternal aunt and maternal grandmother; Depression in her mother; Drug abuse in her brother; Early death in her brother; Heart failure in her father; Learning disabilities in her brother.  She  reports that she has never smoked. She has never used smokeless tobacco. She reports that she does not drink alcohol and does not use drugs.  Current Outpatient Medications   Medication Sig Dispense Refill    Aimovig 140 MG/ML auto-injector Inject 1 mL under the skin into the appropriate area as directed Every 30 (Thirty) Days for 30 days. 1 mL 2    Ergocalciferol (Vitamin D2) 10 MCG (400 UNIT) tablet Take 800 Units by mouth Daily. 90 tablet 3    gabapentin (NEURONTIN) 300 MG capsule       hydrOXYzine (ATARAX) 25 MG tablet Take 1 tablet by mouth 3 (Three) Times a Day As Needed for Itching. 30 tablet 2    meclizine (ANTIVERT) 25 MG tablet Take 0.5 tablets by mouth 3 (Three) Times a Day As Needed for Dizziness. 20 tablet 2    sertraline (Zoloft) 50 MG tablet Take 1 tablet by mouth Daily. 30 tablet 1     No current facility-administered medications for this visit.     Current Outpatient Medications on File Prior to Visit   Medication Sig    Aimovig 140 MG/ML auto-injector Inject 1 mL under the skin into the appropriate area  as directed Every 30 (Thirty) Days for 30 days.    Ergocalciferol (Vitamin D2) 10 MCG (400 UNIT) tablet Take 800 Units by mouth Daily.    gabapentin (NEURONTIN) 300 MG capsule     hydrOXYzine (ATARAX) 25 MG tablet Take 1 tablet by mouth 3 (Three) Times a Day As Needed for Itching.    meclizine (ANTIVERT) 25 MG tablet Take 0.5 tablets by mouth 3 (Three) Times a Day As Needed for Dizziness.     No current facility-administered medications on file prior to visit.     She has No Known Allergies..    Compared to one year ago, the patient feels her physical   health is the same.    Compared to one year ago, the patient feels her mental   health is the same.    Recent Hospitalizations:  She was not admitted to the hospital during the last year.       Current Medical Providers:  Patient Care Team:  Washington Blackburn MD as PCP - General (Family Medicine)  Ty Hermosillo Jr., DO as Emergency Attending (Family Medicine)    Outpatient Medications Prior to Visit   Medication Sig Dispense Refill    Aimovig 140 MG/ML auto-injector Inject 1 mL under the skin into the appropriate area as directed Every 30 (Thirty) Days for 30 days. 1 mL 2    Ergocalciferol (Vitamin D2) 10 MCG (400 UNIT) tablet Take 800 Units by mouth Daily. 90 tablet 3    gabapentin (NEURONTIN) 300 MG capsule       hydrOXYzine (ATARAX) 25 MG tablet Take 1 tablet by mouth 3 (Three) Times a Day As Needed for Itching. 30 tablet 2    meclizine (ANTIVERT) 25 MG tablet Take 0.5 tablets by mouth 3 (Three) Times a Day As Needed for Dizziness. 20 tablet 2     No facility-administered medications prior to visit.       No opioid medication identified on active medication list. I have reviewed chart for other potential  high risk medication/s and harmful drug interactions in the elderly.        Aspirin is not on active medication list.  Aspirin use is not indicated based on review of current medical condition/s. Risk of harm outweighs potential benefits.  .    Patient  "Active Problem List   Diagnosis    Lumbar spondylosis    Degeneration of lumbar intervertebral disc    Class 2 severe obesity due to excess calories with serious comorbidity and body mass index (BMI) of 37.0 to 37.9 in adult    Mixed hyperlipidemia    Moderate episode of recurrent major depressive disorder    Radiculopathy of cervical spine    RLS (restless legs syndrome)    Cervical spondylosis    Depression with anxiety    S/P lumbar fusion    Intermittent claudication    Classic migraine    Lipoma of abdominal wall    Neuropathy    History of sleep apnea     Advance Care Planning   Advance Care Planning     Advance Directive is not on file.  ACP discussion was held with the patient during this visit. Patient has an advance directive (not in EMR), copy requested.     Objective    Vitals:    08/07/23 1314   BP: 122/76   BP Location: Right arm   Patient Position: Sitting   Cuff Size: Adult   Pulse: 82   Temp: 97.1 øF (36.2 øC)   TempSrc: Temporal   SpO2: 94%   Weight: 115 kg (254 lb)   Height: 174 cm (68.5\")   PainSc:   8     Estimated body mass index is 38.06 kg/mý as calculated from the following:    Height as of this encounter: 174 cm (68.5\").    Weight as of this encounter: 115 kg (254 lb).    Class 2 Severe Obesity (BMI >=35 and <=39.9). Obesity-related health conditions include the following: obstructive sleep apnea and dyslipidemias. Obesity is improving with lifestyle modifications. BMI is is above average; BMI management plan is completed. We discussed portion control and increasing exercise.      Does the patient have evidence of cognitive impairment? No    Lab Results   Component Value Date    TRIG 103 07/21/2023    HDL 71 (H) 07/21/2023     (H) 07/21/2023    VLDL 18 07/21/2023        HEALTH RISK ASSESSMENT    Smoking Status:  Social History     Tobacco Use   Smoking Status Never   Smokeless Tobacco Never   Tobacco Comments    daily caffiene     Alcohol Consumption:  Social History     Substance " and Sexual Activity   Alcohol Use No     Fall Risk Screen:    [employer] Fall Risk Assessment was completed, and patient is at HIGH risk for falls. Assessment completed on:2023    Depression Screenin/7/2023     1:21 PM   PHQ-2/PHQ-9 Depression Screening   Little Interest or Pleasure in Doing Things 2-->more than half the days   Feeling Down, Depressed or Hopeless 1-->several days   Trouble Falling or Staying Asleep, or Sleeping Too Much 0-->not at all   Feeling Tired or Having Little Energy 1-->several days   Poor Appetite or Overeating 0-->not at all   Feeling Bad about Yourself - or that You are a Failure or Have Let Yourself or Your Family Down 0-->not at all   Trouble Concentrating on Things, Such as Reading the Newspaper or Watching Television 0-->not at all   Moving or Speaking So Slowly that Other People Could Have Noticed? Or the Opposite - Being So Fidgety 0-->not at all   Thoughts that You Would be Better Off Dead or of Hurting Yourself in Some Way 0-->not at all   PHQ-9: Brief Depression Severity Measure Score 4   If You Checked Off Any Problems, How Difficult Have These Problems Made It For You to Do Your Work, Take Care of Things at Home, or Get Along with Other People? not difficult at all       Health Habits and Functional and Cognitive Screenin/7/2023     1:16 PM   Functional & Cognitive Status   Do you have difficulty preparing food and eating? No   Do you have difficulty bathing yourself, getting dressed or grooming yourself? No   Do you have difficulty using the toilet? No   Do you have difficulty moving around from place to place? Yes   Do you have trouble with steps or getting out of a bed or a chair? Yes   Current Diet Unhealthy Diet   Dental Exam Not up to date   Eye Exam Up to date   Exercise (times per week) 0 times per week   Current Exercises Include No Regular Exercise   Do you need help using the phone?  No   Are you deaf or do you have serious difficulty hearing?  No    Do you need help to go to places out of walking distance? Yes   Do you need help shopping? No   Do you need help preparing meals?  No   Do you need help with housework?  Yes   Do you need help with laundry? Yes   Do you need help taking your medications? No   Do you need help managing money? No   Do you ever drive or ride in a car without wearing a seat belt? No   Have you felt unusual stress, anger or loneliness in the last month? Yes   Who do you live with? Other   If you need help, do you have trouble finding someone available to you? No   Have you been bothered in the last four weeks by sexual problems? No   Do you have difficulty concentrating, remembering or making decisions? Yes       Age-appropriate Screening Schedule:  Refer to the list below for future screening recommendations based on patient's age, sex and/or medical conditions. Orders for these recommended tests are listed in the plan section. The patient has been provided with a written plan.    Health Maintenance   Topic Date Due    COLORECTAL CANCER SCREENING  Never done    COVID-19 Vaccine (1) Never done    TDAP/TD VACCINES (1 - Tdap) Never done    ZOSTER VACCINE (1 of 2) Never done    PAP SMEAR  Never done    MAMMOGRAM  01/10/2022    INFLUENZA VACCINE  10/01/2023    LIPID PANEL  07/21/2024    ANNUAL WELLNESS VISIT  08/07/2024    HEPATITIS C SCREENING  Completed    Pneumococcal Vaccine 0-64  Aged Out                  CMS Preventative Services Quick Reference  Risk Factors Identified During Encounter  Immunizations Discussed/Encouraged: Tdap and Shingrix  Inactivity/Sedentary: Patient was advised to exercise at least 150 minutes a week per CDC recommendations.  The above risks/problems have been discussed with the patient.  Pertinent information has been shared with the patient in the After Visit Summary.  An After Visit Summary and PPPS were made available to the patient.    Follow Up:   Next Medicare Wellness visit to be scheduled in 1 year.        Additional E&M Note during same encounter follows:  Patient has multiple medical problems which are significant and separately identifiable that require additional work above and beyond the Medicare Wellness Visit.      Chief Complaint  Medicare Wellness-subsequent (She has had Medicare for a while. )    Subjective        Depression  Visit Type: initial  Onset of symptoms: more than 1 year ago  Progression since onset: unchanged  Patient presents with the following symptoms: decreased concentration, depressed mood and insomnia.  Patient is not experiencing: anhedonia, chest pain, choking sensation, compulsions, confusion, dizziness, dry mouth, excessive worry, fatigue, feelings of hopelessness, feelings of worthlessness, hypersomnia, hyperventilation, irritability, malaise, memory impairment, muscle tension, nausea, nervousness/anxiety, obsessions, palpitations, panic, psychomotor agitation, psychomotor retardation, restlessness, shortness of breath, suicidal ideas, suicidal planning, thoughts of death, weight gain and weight loss.  Frequency of symptoms: constantly   Severity: moderate   Sleep quality: fair  Nighttime awakenings: several  Treatment tried: zhao Rivas is also being seen today for depression      Review of Systems   Constitutional:  Negative for fatigue, fever, irritability, unexpected weight gain and unexpected weight loss.   HENT:  Negative for sore throat.    Eyes:  Negative for visual disturbance.   Respiratory:  Negative for cough, choking, chest tightness, shortness of breath and wheezing.    Cardiovascular:  Negative for chest pain, palpitations and leg swelling.   Gastrointestinal:  Negative for abdominal pain, diarrhea, nausea and vomiting.   Musculoskeletal:  Positive for arthralgias.   Neurological:  Negative for seizures, light-headedness and confusion.   Psychiatric/Behavioral:  Positive for decreased concentration and depressed mood. Negative for dysphoric mood,  "sleep disturbance and suicidal ideas. The patient has insomnia. The patient is not nervous/anxious.      Objective   Vital Signs:  /76 (BP Location: Right arm, Patient Position: Sitting, Cuff Size: Adult)   Pulse 82   Temp 97.1 øF (36.2 øC) (Temporal)   Ht 174 cm (68.5\")   Wt 115 kg (254 lb)   SpO2 94%   BMI 38.06 kg/mý     Physical Exam  Vitals reviewed.   Constitutional:       Appearance: Normal appearance. She is well-developed.   HENT:      Head: Normocephalic and atraumatic.      Right Ear: External ear normal.      Left Ear: External ear normal.      Mouth/Throat:      Pharynx: No oropharyngeal exudate.   Eyes:      Conjunctiva/sclera: Conjunctivae normal.      Pupils: Pupils are equal, round, and reactive to light.   Cardiovascular:      Rate and Rhythm: Normal rate and regular rhythm.      Pulses: Normal pulses.      Heart sounds: Normal heart sounds. No murmur heard.    No friction rub. No gallop.   Pulmonary:      Effort: Pulmonary effort is normal.      Breath sounds: Normal breath sounds. No wheezing or rhonchi.   Abdominal:      General: Abdomen is flat. Bowel sounds are normal. There is no distension.      Palpations: Abdomen is soft. There is no mass.      Tenderness: There is no abdominal tenderness. There is no guarding or rebound.      Hernia: No hernia is present.   Musculoskeletal:         General: Normal range of motion.   Skin:     General: Skin is warm and dry.      Capillary Refill: Capillary refill takes less than 2 seconds.   Neurological:      General: No focal deficit present.      Mental Status: She is alert and oriented to person, place, and time.      Cranial Nerves: No cranial nerve deficit.   Psychiatric:         Mood and Affect: Mood and affect normal.         Behavior: Behavior normal.         Thought Content: Thought content normal.         Judgment: Judgment normal.        The following data was reviewed by: Washington Blackburn MD on 08/07/2023:  [facility]          7/21/2023    " 14:11   CMP   Glucose 97    BUN 16    Creatinine 0.87    EGFR 75.9    Sodium 145    Potassium 4.4    Chloride 105    Calcium 9.6    Total Protein 7.0    Albumin 4.2    Globulin 2.8    Total Bilirubin 0.4    Alkaline Phosphatase 59    AST (SGOT) 21    ALT (SGPT) 11    Albumin/Globulin Ratio 1.5    BUN/Creatinine Ratio 18.4    Anion Gap 8.9      CBC          7/21/2023    14:11   CBC   WBC 7.43    RBC 4.79    Hemoglobin 15.2    Hematocrit 44.1    MCV 92.1    MCH 31.7    MCHC 34.5    RDW 12.8    Platelets 278      Lipid Panel          7/21/2023    14:11   Lipid Panel   Total Cholesterol 269    Triglycerides 103    HDL Cholesterol 71    VLDL Cholesterol 18    LDL Cholesterol  180    LDL/HDL Ratio 2.50                   Assessment and Plan   Diagnoses and all orders for this visit:    1. Medicare annual wellness visit, subsequent (Primary)    2. Anxiety and depression  -     sertraline (Zoloft) 50 MG tablet; Take 1 tablet by mouth Daily.  Dispense: 30 tablet; Refill: 1    3. Mixed hyperlipidemia    Counseled on diet and exercise- gave written diet         Follow Up   Return in about 2 weeks (around 8/21/2023) for Recheck, pt needs to see female provider for well woman.  Patient was given instructions and counseling regarding her condition or for health maintenance advice. Please see specific information pulled into the AVS if appropriate.

## 2023-08-14 ENCOUNTER — HOSPITAL ENCOUNTER (OUTPATIENT)
Dept: MAMMOGRAPHY | Facility: HOSPITAL | Age: 63
Discharge: HOME | End: 2023-08-14
Admitting: FAMILY MEDICINE
Payer: OTHER MISCELLANEOUS

## 2023-08-14 DIAGNOSIS — Z12.31 ENCOUNTER FOR SCREENING MAMMOGRAM FOR MALIGNANT NEOPLASM OF BREAST: ICD-10-CM

## 2023-08-14 PROCEDURE — 77063 BREAST TOMOSYNTHESIS BI: CPT

## 2023-08-14 PROCEDURE — 77067 SCR MAMMO BI INCL CAD: CPT

## 2023-08-15 ENCOUNTER — OFFICE VISIT (OUTPATIENT)
Dept: FAMILY MEDICINE CLINIC | Facility: CLINIC | Age: 63
End: 2023-08-15
Payer: OTHER MISCELLANEOUS

## 2023-08-15 VITALS
HEIGHT: 69 IN | HEART RATE: 90 BPM | TEMPERATURE: 97.3 F | SYSTOLIC BLOOD PRESSURE: 102 MMHG | BODY MASS INDEX: 36.88 KG/M2 | DIASTOLIC BLOOD PRESSURE: 78 MMHG | OXYGEN SATURATION: 100 % | WEIGHT: 249 LBS

## 2023-08-15 DIAGNOSIS — M54.41 CHRONIC RIGHT-SIDED LOW BACK PAIN WITH BILATERAL SCIATICA: Primary | ICD-10-CM

## 2023-08-15 DIAGNOSIS — R92.8 ABNORMAL MAMMOGRAM OF LEFT BREAST: Primary | ICD-10-CM

## 2023-08-15 DIAGNOSIS — M54.42 CHRONIC RIGHT-SIDED LOW BACK PAIN WITH BILATERAL SCIATICA: Primary | ICD-10-CM

## 2023-08-15 DIAGNOSIS — M79.652 PAIN IN BOTH THIGHS: ICD-10-CM

## 2023-08-15 DIAGNOSIS — M79.651 PAIN IN BOTH THIGHS: ICD-10-CM

## 2023-08-15 DIAGNOSIS — G89.29 CHRONIC RIGHT-SIDED LOW BACK PAIN WITH BILATERAL SCIATICA: Primary | ICD-10-CM

## 2023-08-15 PROCEDURE — 99213 OFFICE O/P EST LOW 20 MIN: CPT | Performed by: FAMILY MEDICINE

## 2023-08-15 RX ORDER — LIDOCAINE 50 MG/G
3 PATCH TOPICAL EVERY 24 HOURS
Qty: 90 PATCH | Refills: 1 | Status: SHIPPED | OUTPATIENT
Start: 2023-08-15

## 2023-08-15 RX ORDER — METHOCARBAMOL 500 MG/1
500 TABLET, FILM COATED ORAL 4 TIMES DAILY
Qty: 120 TABLET | Refills: 1 | Status: SHIPPED | OUTPATIENT
Start: 2023-08-15
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
## 2023-08-31 ENCOUNTER — HOSPITAL ENCOUNTER (OUTPATIENT)
Dept: ULTRASOUND IMAGING | Facility: HOSPITAL | Age: 63
Discharge: HOME | End: 2023-08-31
Payer: MEDICARE

## 2023-08-31 ENCOUNTER — HOSPITAL ENCOUNTER (OUTPATIENT)
Dept: MAMMOGRAPHY | Facility: HOSPITAL | Age: 63
Discharge: HOME | End: 2023-08-31
Payer: MEDICARE

## 2023-08-31 DIAGNOSIS — R92.8 ABNORMAL MAMMOGRAM OF LEFT BREAST: ICD-10-CM

## 2023-08-31 PROCEDURE — 77065 DX MAMMO INCL CAD UNI: CPT

## 2023-08-31 PROCEDURE — G0279 TOMOSYNTHESIS, MAMMO: HCPCS

## 2023-08-31 PROCEDURE — 76642 ULTRASOUND BREAST LIMITED: CPT

## 2023-11-15 DIAGNOSIS — M54.42 CHRONIC RIGHT-SIDED LOW BACK PAIN WITH BILATERAL SCIATICA: ICD-10-CM

## 2023-11-15 DIAGNOSIS — M79.652 PAIN IN BOTH THIGHS: ICD-10-CM

## 2023-11-15 DIAGNOSIS — M79.651 PAIN IN BOTH THIGHS: ICD-10-CM

## 2023-11-15 DIAGNOSIS — M54.41 CHRONIC RIGHT-SIDED LOW BACK PAIN WITH BILATERAL SCIATICA: ICD-10-CM

## 2023-11-15 DIAGNOSIS — G89.29 CHRONIC RIGHT-SIDED LOW BACK PAIN WITH BILATERAL SCIATICA: ICD-10-CM

## 2023-11-15 DIAGNOSIS — F32.A ANXIETY AND DEPRESSION: ICD-10-CM

## 2023-11-15 DIAGNOSIS — F41.9 ANXIETY AND DEPRESSION: ICD-10-CM

## 2023-11-15 RX ORDER — METHOCARBAMOL 500 MG/1
500 TABLET, FILM COATED ORAL 4 TIMES DAILY
Qty: 28 TABLET | Refills: 0 | Status: SHIPPED | OUTPATIENT
Start: 2023-11-15

## 2023-12-11 ENCOUNTER — TELEPHONE (OUTPATIENT)
Dept: FAMILY MEDICINE CLINIC | Facility: CLINIC | Age: 63
End: 2023-12-11
Payer: OTHER MISCELLANEOUS

## 2023-12-11 NOTE — TELEPHONE ENCOUNTER
Caller: Jaye Rivas    Relationship to patient: Self    Best call back number: [phone]    Date of exposure:     Date of positive COVID19 test: SATURDAY     Date if possible COVID19 exposure:     COVID19 symptoms: CAN'T KEEP ANYTHING DOWN, HEADACHES, CHEST IS HURTING     Date of initial quarantine: SATURDAY     Additional information or concerns: PATIENT WOULD LIKE TO HAVE SOME MEDICATION CALLED IN IF POSSIBLE.  PLEASE ADVISE     What is the patients preferred pharmacy: [facility] [address] [phone] [facility] [phone]          ”

## 2023-12-19 ENCOUNTER — OFFICE VISIT (OUTPATIENT)
Dept: FAMILY MEDICINE CLINIC | Facility: CLINIC | Age: 63
End: 2023-12-19
Payer: MEDICARE

## 2023-12-19 VITALS
DIASTOLIC BLOOD PRESSURE: 66 MMHG | OXYGEN SATURATION: 96 % | WEIGHT: 258.5 LBS | HEART RATE: 93 BPM | BODY MASS INDEX: 38.17 KG/M2 | SYSTOLIC BLOOD PRESSURE: 106 MMHG | TEMPERATURE: 97.4 F

## 2023-12-19 DIAGNOSIS — R42 VERTIGO: ICD-10-CM

## 2023-12-19 DIAGNOSIS — F41.9 ANXIETY AND DEPRESSION: ICD-10-CM

## 2023-12-19 DIAGNOSIS — L29.9 ITCHING: ICD-10-CM

## 2023-12-19 DIAGNOSIS — J30.9 ALLERGIC RHINITIS, UNSPECIFIED SEASONALITY, UNSPECIFIED TRIGGER: Primary | ICD-10-CM

## 2023-12-19 DIAGNOSIS — U07.1 COVID-19 VIRUS INFECTION: ICD-10-CM

## 2023-12-19 DIAGNOSIS — F32.A ANXIETY AND DEPRESSION: ICD-10-CM

## 2023-12-19 PROCEDURE — 1159F MED LIST DOCD IN RCRD: CPT | Performed by: FAMILY MEDICINE

## 2023-12-19 PROCEDURE — 99214 OFFICE O/P EST MOD 30 MIN: CPT | Performed by: FAMILY MEDICINE

## 2023-12-19 PROCEDURE — 1160F RVW MEDS BY RX/DR IN RCRD: CPT | Performed by: FAMILY MEDICINE

## 2023-12-19 RX ORDER — MECLIZINE HYDROCHLORIDE 25 MG/1
12.5 TABLET ORAL 3 TIMES DAILY PRN
Qty: 20 TABLET | Refills: 2 | Status: SHIPPED | OUTPATIENT
Start: 2023-12-19

## 2023-12-19 RX ORDER — HYDROXYZINE HYDROCHLORIDE 25 MG/1
25 TABLET, FILM COATED ORAL 3 TIMES DAILY PRN
Qty: 30 TABLET | Refills: 2 | Status: SHIPPED | OUTPATIENT
Start: 2023-12-19

## 2023-12-19 RX ORDER — FLUTICASONE PROPIONATE 50 MCG
2 SPRAY, SUSPENSION (ML) NASAL DAILY
Qty: 16 G | Refills: 2 | Status: SHIPPED | OUTPATIENT
Start: 2023-12-19

## 2023-12-19 NOTE — PROGRESS NOTES
Chief Complaint  Cough (X over 6 months), Chills (X3 weeks), and Nausea    Subjective      Cough  Associated symptoms include chills.   Chills  Associated symptoms include chills, coughing and nausea.   Nausea  Associated symptoms include chills, coughing and nausea.     Jaye Rivas is a 63 y.o. female who presents to [facility] with a past medical history of  Past Medical History:   Diagnosis Date    Cervical spondylosis 06/15/2020    History of sleep apnea     Lumbar spondylosis 08/15/2018    Mixed hyperlipidemia 01/13/2020 1/13/20: .  Family history of heart disease, father.  Start Lipitor 10 mg daily.  6/8/2020: Controlled with Lipitor 10 mg daily. No medication side effects. Continue current treatment.     Cough for about 6 months, chills for about 3 weeks, nausea.  Patient tested positive for COVID-19 10 days ago on 12/9/2023. She is currently on the last day of paxlovid. She would like tested for TB. Breathing okay. Had a lot of congestion but then she started a nasal spray which has been helpful. Still coughing a lot especially at night and when she first gets up in the morning. Nonproductive cough. She says the sinuses have been inflamed recently. No travel outside the country or close contact with anyone who has. Initially had loss of taste with the covid but she has her sense of taste back now.    Objective   Vital Signs:   Vitals:    12/19/23 1417   BP: 106/66   BP Location: Right arm   Patient Position: Sitting   Cuff Size: Adult   Pulse: 93   Temp: 97.4 °F (36.3 °C)   SpO2: 96%   Weight: 117 kg (258 lb 8 oz)     Body mass index is 38.17 kg/m².    Wt Readings from Last 3 Encounters:   12/19/23 117 kg (258 lb 8 oz)   08/15/23 113 kg (249 lb)   08/07/23 115 kg (254 lb)     BP Readings from Last 3 Encounters:   12/19/23 106/66   08/15/23 102/78   08/07/23 122/76       Health Maintenance   Topic Date Due    COVID-19 Vaccine (1) Never done    PAP SMEAR  Never  done    INFLUENZA VACCINE  08/01/2023    LIPID PANEL  07/21/2024    ANNUAL WELLNESS VISIT  08/07/2024    BMI FOLLOWUP  08/15/2024    MAMMOGRAM  08/31/2025    COLORECTAL CANCER SCREENING  08/10/2026    TDAP/TD VACCINES (2 - Td or Tdap) 08/07/2033    HEPATITIS C SCREENING  Completed    ZOSTER VACCINE  Completed    Pneumococcal Vaccine 0-64  Aged Out       Physical Exam  Vitals and nursing note reviewed.   Constitutional:       General: She is not in acute distress.     Appearance: Normal appearance.   HENT:      Head: Normocephalic and atraumatic.      Nose: Rhinorrhea present.      Mouth/Throat:      Pharynx: Posterior oropharyngeal erythema present. No oropharyngeal exudate.   Cardiovascular:      Rate and Rhythm: Normal rate and regular rhythm.      Heart sounds: No murmur heard.  Pulmonary:      Effort: Pulmonary effort is normal. No respiratory distress.      Breath sounds: Normal breath sounds. No wheezing.   Musculoskeletal:      Cervical back: No rigidity or tenderness.   Lymphadenopathy:      Cervical: No cervical adenopathy.   Skin:     General: Skin is warm and dry.   Neurological:      Mental Status: She is alert.   Psychiatric:         Mood and Affect: Mood normal.         Behavior: Behavior normal.            Result Review :  The following data was reviewed by: Jag Yanez MD on 12/19/2023:      Procedures        Assessment and Plan   Diagnoses and all orders for this visit:    1. Allergic rhinitis, unspecified seasonality, unspecified trigger (Primary)  -     fluticasone (FLONASE) 50 MCG/ACT nasal spray; 2 sprays into the nostril(s) as directed by provider Daily.  Dispense: 16 g; Refill: 2    2. Anxiety and depression  -     Discontinue: sertraline (ZOLOFT) 50 MG tablet; Take 1 tablet by mouth Daily.  Dispense: 7 tablet; Refill: 0  -     sertraline (ZOLOFT) 50 MG tablet; Take 1 tablet by mouth Daily for 90 days.  Dispense: 30 tablet; Refill: 2    3. Vertigo  -     meclizine (ANTIVERT) 25 MG  tablet; Take 0.5 tablets by mouth 3 (Three) Times a Day As Needed for Dizziness.  Dispense: 20 tablet; Refill: 2    4. Itching  -     hydrOXYzine (ATARAX) 25 MG tablet; Take 1 tablet by mouth 3 (Three) Times a Day As Needed for Itching.  Dispense: 30 tablet; Refill: 2    5. COVID-19 virus infection      Symptoms improving from COVID infection.  Discussed causes of subacute and chronic cough.  TB unlikely without any travel from the country or unexplained weight loss.  Not on an ACE inhibitor.  No signs of heartburn.  Will start with Flonase nasal spray as postnasal drip is one of the most common causes of chronic cough.  If no improvement, she will return for further evaluation in the cough.     Medication refills provided today.  Sertraline is working well for her anxiety and depression.  She takes meclizine and hydroxyzine as needed for vertigo and itching respectively, they are working well.    FOLLOW UP  Return if symptoms worsen or fail to improve.  Patient was given instructions and counseling regarding her condition or for health maintenance advice. Please see specific information pulled into the AVS if appropriate.       Jag Yanez MD  12/19/23  14:38 EST    CURRENT & DISCONTINUED MEDICATIONS  Current Outpatient Medications   Medication Instructions    fluticasone (FLONASE) 50 MCG/ACT nasal spray 2 sprays, Nasal, Daily    gabapentin (NEURONTIN) 300 MG capsule     hydrOXYzine (ATARAX) 25 mg, Oral, 3 Times Daily PRN    lidocaine (Lidoderm) 5 % 3 patches, Transdermal, Every 24 Hours, Remove & Discard patch within 12 hours or as directed by MD    meclizine (ANTIVERT) 12.5 mg, Oral, 3 Times Daily PRN    methocarbamol (ROBAXIN) 500 mg, Oral, 4 Times Daily    sertraline (ZOLOFT) 50 mg, Oral, Daily    Vitamin D2 800 Units, Oral, Daily       Medications Discontinued During This Encounter   Medication Reason    hydrOXYzine (ATARAX) 25 MG tablet Reorder    meclizine (ANTIVERT) 25 MG tablet Reorder     sertraline (ZOLOFT) 50 MG tablet Reorder    sertraline (ZOLOFT) 50 MG tablet Reorder

## 2023-12-19 NOTE — PATIENT INSTRUCTIONS
If you have tested POSITIVE FOR COVID-19 and HAVE symptoms:  Isolate for   10 Days   Days from the date symptoms began     If symptoms fully resolve, isolation may be shortened and end after day 5 on the first day without symptoms.   Wear a well-fitting face mask for 10 full days since the start of symptoms. Isolation should not be shortened if a mask cannot be worn properly and consistently         If you have tested POSITIVE FOR COVID-19 and have NEVER HAD SYMPTOMS:    Isolate for  5 Days  Days from the date you had your test done    Wear a well-fitting face mask for 5 additional days.  Isolation should be extended to 10 days if a mask cannot be worn properly and consistently.        If you are not fully vaccinated OR booster-eligible but not yet boosted and have been in CLOSE CONTACT with someone diagnosed with COVID-19:    Isolate for  10 Days  From your last exposure      Quarantine may be shortened to 5 days if you have no symptoms and test negative for COVID-19 on day 5.  Wear a well-fitting face mask for 10 days from your last exposure.  Stay home and get a test if symptoms develop        If you are boosted or fully-vaccinated but not yet booster-eligible* and have been in CLOSE CONTACT with someone diagnosed with COVID-19     You do not need to quarantine if you do not have symptoms.  Wear a well-fitting face mask for 10 full days from your last exposure.  Get tested for COVID-19 on day 5.  Stay home and get a test if symptoms develop.

## 2024-01-06 DIAGNOSIS — R42 VERTIGO: ICD-10-CM

## 2024-01-06 RX ORDER — MECLIZINE HYDROCHLORIDE 25 MG/1
TABLET ORAL
Qty: 20 TABLET | Refills: 2 | OUTPATIENT
Start: 2024-01-06

## 2024-05-03 DIAGNOSIS — E55.9 VITAMIN D DEFICIENCY: Primary | ICD-10-CM

## 2024-05-04 RX ORDER — OMEGA-3S/DHA/EPA/FISH OIL/D3 300MG-1000
800 CAPSULE ORAL DAILY
Qty: 90 TABLET | Refills: 3 | Status: SHIPPED | OUTPATIENT
Start: 2024-05-04